# Patient Record
Sex: FEMALE
[De-identification: names, ages, dates, MRNs, and addresses within clinical notes are randomized per-mention and may not be internally consistent; named-entity substitution may affect disease eponyms.]

---

## 2017-01-30 ENCOUNTER — APPOINTMENT (OUTPATIENT)
Dept: PULMONOLOGY | Facility: CLINIC | Age: 57
End: 2017-01-30

## 2017-05-06 ENCOUNTER — TRANSCRIPTION ENCOUNTER (OUTPATIENT)
Age: 57
End: 2017-05-06

## 2018-09-12 ENCOUNTER — TRANSCRIPTION ENCOUNTER (OUTPATIENT)
Age: 58
End: 2018-09-12

## 2018-11-15 ENCOUNTER — TRANSCRIPTION ENCOUNTER (OUTPATIENT)
Age: 58
End: 2018-11-15

## 2019-02-03 ENCOUNTER — TRANSCRIPTION ENCOUNTER (OUTPATIENT)
Age: 59
End: 2019-02-03

## 2019-04-04 ENCOUNTER — APPOINTMENT (OUTPATIENT)
Dept: ORTHOPEDIC SURGERY | Facility: CLINIC | Age: 59
End: 2019-04-04

## 2019-12-16 ENCOUNTER — APPOINTMENT (OUTPATIENT)
Dept: ORTHOPEDIC SURGERY | Facility: CLINIC | Age: 59
End: 2019-12-16
Payer: MEDICAID

## 2019-12-16 VITALS
WEIGHT: 175 LBS | SYSTOLIC BLOOD PRESSURE: 117 MMHG | DIASTOLIC BLOOD PRESSURE: 77 MMHG | HEIGHT: 66 IN | BODY MASS INDEX: 28.12 KG/M2 | HEART RATE: 88 BPM

## 2019-12-16 DIAGNOSIS — Z80.7 FAMILY HISTORY OF OTHER MALIGNANT NEOPLASMS OF LYMPHOID, HEMATOPOIETIC AND RELATED TISSUES: ICD-10-CM

## 2019-12-16 DIAGNOSIS — Z78.9 OTHER SPECIFIED HEALTH STATUS: ICD-10-CM

## 2019-12-16 DIAGNOSIS — Z80.6 FAMILY HISTORY OF LEUKEMIA: ICD-10-CM

## 2019-12-16 DIAGNOSIS — Z82.49 FAMILY HISTORY OF ISCHEMIC HEART DISEASE AND OTHER DISEASES OF THE CIRCULATORY SYSTEM: ICD-10-CM

## 2019-12-16 DIAGNOSIS — Z87.39 PERSONAL HISTORY OF OTHER DISEASES OF THE MUSCULOSKELETAL SYSTEM AND CONNECTIVE TISSUE: ICD-10-CM

## 2019-12-16 DIAGNOSIS — M67.441 GANGLION, RIGHT HAND: ICD-10-CM

## 2019-12-16 DIAGNOSIS — M19.041 PRIMARY OSTEOARTHRITIS, RIGHT HAND: ICD-10-CM

## 2019-12-16 PROCEDURE — 73140 X-RAY EXAM OF FINGER(S): CPT | Mod: F6

## 2019-12-16 PROCEDURE — 99204 OFFICE O/P NEW MOD 45 MIN: CPT

## 2020-02-02 ENCOUNTER — TRANSCRIPTION ENCOUNTER (OUTPATIENT)
Age: 60
End: 2020-02-02

## 2020-02-11 ENCOUNTER — APPOINTMENT (OUTPATIENT)
Dept: PULMONOLOGY | Facility: CLINIC | Age: 60
End: 2020-02-11
Payer: MEDICAID

## 2020-02-11 VITALS
SYSTOLIC BLOOD PRESSURE: 123 MMHG | RESPIRATION RATE: 18 BRPM | HEIGHT: 68 IN | TEMPERATURE: 97.3 F | OXYGEN SATURATION: 97 % | HEART RATE: 95 BPM | DIASTOLIC BLOOD PRESSURE: 80 MMHG | WEIGHT: 189 LBS | BODY MASS INDEX: 28.64 KG/M2

## 2020-02-11 PROCEDURE — 99204 OFFICE O/P NEW MOD 45 MIN: CPT | Mod: 25

## 2020-02-11 PROCEDURE — 36415 COLL VENOUS BLD VENIPUNCTURE: CPT

## 2020-02-11 NOTE — REVIEW OF SYSTEMS
[Fatigue] : fatigue [Postnasal Drip] : postnasal drip [Cough] : cough [Frequent URIs] : frequent URIs [Chest Tightness] : chest tightness [Dyspnea] : dyspnea [Wheezing] : wheezing [SOB on Exertion] : sob on exertion [GERD] : gerd [Myalgias] : myalgias [Headache] : headache [Tremor] : tremor [Depression] : depression [Anxiety] : anxiety [Negative] : Endocrine [TextBox_44] : pulmonary stenosis

## 2020-02-11 NOTE — HISTORY OF PRESENT ILLNESS
[Never] : never [TextBox_4] : Patient with PMH asthma, sarcoidosis, mild pulmonary stenosis presents for pulmonary evaluation following 2 week hospitalization at [facility] and [place] in January 2020. Current pulmonologist is Dr. Emir George.\par \par She was dx Asthma age 28, however over past 4 years has gotten much worse. Has only been on Dulera 200mcg as a controller inhaler, no prior controller was required. Has been hospitalized 4-5 times over the past year, most recently in January with exacerbation +coronavirus requiring frequent duonebs and IV steroids. Treated with cefpodoxime inpatient. Discharged from [facility] and readmitted to [facility] the next day. Total course about 2 weeks. 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 pulmonary involvement. Dx approx 20 years ago.\par \par She has been on fasenra for about 1.5 years, however has not helped. overdue for next injection by 1 month. Controller regimen now includes singulair, dulera, spiriva. Currently on prednisone 40mg daily, has been tapering down since hospitalization. Currently taking about 4 duoneb treatments daily.\par \par Lifelong nonsmoker. Hx anxiety depression. Symptoms include dyspnea, chest pain/tightness, cough, wheeze. Denies sudden onset, no vocal symptoms. Possible PND, no therapy aside from saline. GERD treated with protonix.

## 2020-02-11 NOTE — ASSESSMENT
[FreeTextEntry1] : 1. Asthma: S/p exacerbation, now recovering. Will continue prednisone taper 30mg x 3 days, 20mg x 3 days, 10mg x 3 days. Advised to use Xopenex prn chest tightness or wheezing, nebulizer only when symptoms are very significant. For insurance coverage will try to start patient on Breo 200mcg, Incruse as maintenance medication for asthma. Continue Montelukast. Patient educated and demonstration provided on administration. Will transition Fasenra injection to our office, however patient is advised to obtain current injection as her prior pulmonologist has already ordered it for this month. Will prescribe Fluticasone nasal spray for nasal congestion/PND. Check A1A for congenital COPD. Patient educated on administration and importance of daily maintenance use for efficacy. return to office in 2 weeka for f/u.

## 2020-02-11 NOTE — END OF VISIT
[FreeTextEntry3] : I directly supervised nurse practitioner Kwame Thomson and was present during key points of his history and physical. I agree with his history, physical and assessment.\par

## 2020-02-11 NOTE — PHYSICAL EXAM
[Normal Appearance] : normal appearance [No Acute Distress] : no acute distress [Normal Oropharynx] : normal oropharynx [No Neck Mass] : no neck mass [No Resp Distress] : no resp distress [Normal S1, S2] : normal s1, s2 [No Murmurs] : no murmurs [Clear to Auscultation Bilaterally] : clear to auscultation bilaterally [No Abnormalities] : no abnormalities [Benign] : benign [No Clubbing] : no clubbing [Normal Gait] : normal gait [No Cyanosis] : no cyanosis [No Edema] : no edema [FROM] : FROM [No Focal Deficits] : no focal deficits [Oriented x3] : oriented x3 [Normal Color/ Pigmentation] : normal color/ pigmentation [TextBox_54] : tachycardic

## 2020-02-12 LAB — A1AT SERPL-MCNC: 133 MG/DL

## 2020-02-20 ENCOUNTER — APPOINTMENT (OUTPATIENT)
Dept: RADIOLOGY | Facility: IMAGING CENTER | Age: 60
End: 2020-02-20
Payer: MEDICAID

## 2020-02-20 ENCOUNTER — OUTPATIENT (OUTPATIENT)
Dept: OUTPATIENT SERVICES | Facility: HOSPITAL | Age: 60
LOS: 1 days | End: 2020-02-20
Payer: MEDICAID

## 2020-02-20 ENCOUNTER — LABORATORY RESULT (OUTPATIENT)
Age: 60
End: 2020-02-20

## 2020-02-20 ENCOUNTER — APPOINTMENT (OUTPATIENT)
Dept: PULMONOLOGY | Facility: CLINIC | Age: 60
End: 2020-02-20
Payer: MEDICAID

## 2020-02-20 VITALS
DIASTOLIC BLOOD PRESSURE: 89 MMHG | RESPIRATION RATE: 17 BRPM | HEART RATE: 118 BPM | HEIGHT: 68 IN | TEMPERATURE: 97.5 F | OXYGEN SATURATION: 97 % | WEIGHT: 187 LBS | SYSTOLIC BLOOD PRESSURE: 137 MMHG | BODY MASS INDEX: 28.34 KG/M2

## 2020-02-20 DIAGNOSIS — J45.909 UNSPECIFIED ASTHMA, UNCOMPLICATED: ICD-10-CM

## 2020-02-20 PROCEDURE — 99213 OFFICE O/P EST LOW 20 MIN: CPT | Mod: 25

## 2020-02-20 PROCEDURE — 71047 X-RAY EXAM CHEST 3 VIEWS: CPT

## 2020-02-20 PROCEDURE — 36415 COLL VENOUS BLD VENIPUNCTURE: CPT

## 2020-02-20 PROCEDURE — 71047 X-RAY EXAM CHEST 3 VIEWS: CPT | Mod: 26

## 2020-02-20 NOTE — PHYSICAL EXAM
[Normal Oropharynx] : normal oropharynx [No Acute Distress] : no acute distress [No Neck Mass] : no neck mass [Normal Appearance] : normal appearance [Normal S1, S2] : normal s1, s2 [No Murmurs] : no murmurs [No Resp Distress] : no resp distress [No Abnormalities] : no abnormalities [Clear to Auscultation Bilaterally] : clear to auscultation bilaterally [Benign] : benign [Normal Gait] : normal gait [No Clubbing] : no clubbing [No Cyanosis] : no cyanosis [FROM] : FROM [No Edema] : no edema [Normal Color/ Pigmentation] : normal color/ pigmentation [No Focal Deficits] : no focal deficits [Oriented x3] : oriented x3 [TextBox_54] : tachycardic

## 2020-02-20 NOTE — REVIEW OF SYSTEMS
[Fatigue] : fatigue [Postnasal Drip] : postnasal drip [Frequent URIs] : frequent URIs [Chest Tightness] : chest tightness [Cough] : cough [Dyspnea] : dyspnea [Wheezing] : wheezing [SOB on Exertion] : sob on exertion [GERD] : gerd [Myalgias] : myalgias [Headache] : headache [Tremor] : tremor [Anxiety] : anxiety [Depression] : depression [Negative] : Endocrine [TextBox_44] : pulmonary stenosis

## 2020-02-20 NOTE — HISTORY OF PRESENT ILLNESS
[Never] : never [TextBox_4] : Patient with PMH asthma, sarcoidosis (s/p splenectomy), mild pulmonary stenosis presents for followup.\par \par She was dx Asthma age 28, however over past 4 years has gotten much worse. Has only been on Dulera 200mcg as a controller inhaler, no prior controller was required. Has been hospitalized 4-5 times over the past year, most recently in January with exacerbation +coronavirus requiring frequent duonebs and IV steroids. Treated with cefpodoxime inpatient. Discharged from [facility] and readmitted to [facility] the next day. Total course about 2 weeks. Has been refractory to steroid taper, was on 60mg at discharge with plan to taper down to 40mg until seen by pulmonary. She has been on prednisone or IV steroids for most of the past 3 months.\par \par She has been on fasenra for about 1.5 years, last injection was last week with prior pulmonologist. Controller regimen now includes singulair, Breo, Incruse. Currently on prednisone 30mg daily, had been tapering down but symptoms recurred at 10mg. No longer taking duonebs, xopenex HFA about twice daily. ?PND treated with flonase daily. GERD treated with protonix.\par \par Symptoms worsened about 3 days ago, with ongoing dyspnea, chest tightness, cough. She sometimes wheezes. Denies fevers, chills. Some fatigue. ECG/echo during hospitalization negative aside from mild PS. Prescribed augmentin x 1 week for green sputum and increased prednisone to 30mg with a tapering schedule at that time. She states that symptoms are about the same however no further green sputum.\par \par Hx of anxiety and depression, currently feels very overwhelmed by recent hospitalization and asthma exacerbation. Currently has been prescribed alprazolam prn by PCP and sees a  weekly for therapy. Has never been prescribed chronic medication for anxiety or depression. Denies suicidal ideation at this time. Denies thoughts of harming others. Denies illicit drug use.

## 2020-02-21 LAB
A ALTERNATA IGE QN: <0.1 KUA/L
A FUMIGATUS IGE QN: <0.1 KUA/L
ANION GAP SERPL CALC-SCNC: 16 MMOL/L
BASOPHILS # BLD AUTO: 0.03 K/UL
BASOPHILS NFR BLD AUTO: 0.2 %
BUN SERPL-MCNC: 18 MG/DL
C ALBICANS IGE QN: <0.1 KUA/L
C HERBARUM IGE QN: <0.1 KUA/L
CALCIUM SERPL-MCNC: 9.3 MG/DL
CAT DANDER IGE QN: <0.1 KUA/L
CHLORIDE SERPL-SCNC: 100 MMOL/L
CO2 SERPL-SCNC: 27 MMOL/L
COMMON RAGWEED IGE QN: <0.1 KUA/L
CREAT SERPL-MCNC: 0.95 MG/DL
D FARINAE IGE QN: 0.32 KUA/L
D PTERONYSS IGE QN: 0.23 KUA/L
DEPRECATED A ALTERNATA IGE RAST QL: 0
DEPRECATED A FUMIGATUS IGE RAST QL: 0
DEPRECATED C ALBICANS IGE RAST QL: 0
DEPRECATED C HERBARUM IGE RAST QL: 0
DEPRECATED CAT DANDER IGE RAST QL: 0
DEPRECATED COMMON RAGWEED IGE RAST QL: 0
DEPRECATED D FARINAE IGE RAST QL: NORMAL
DEPRECATED D PTERONYSS IGE RAST QL: NORMAL
DEPRECATED DOG DANDER IGE RAST QL: 0
DEPRECATED M RACEMOSUS IGE RAST QL: 0
DEPRECATED ROACH IGE RAST QL: 0
DEPRECATED TIMOTHY IGE RAST QL: 0
DEPRECATED WHITE OAK IGE RAST QL: 0
DOG DANDER IGE QN: <0.1 KUA/L
EOSINOPHIL # BLD AUTO: 0.08 K/UL
EOSINOPHIL NFR BLD AUTO: 0.5 %
GLUCOSE SERPL-MCNC: 97 MG/DL
HCT VFR BLD CALC: 46.6 %
HGB BLD-MCNC: 14.4 G/DL
IMM GRANULOCYTES NFR BLD AUTO: 1.4 %
LYMPHOCYTES # BLD AUTO: 1.83 K/UL
LYMPHOCYTES NFR BLD AUTO: 11.4 %
M RACEMOSUS IGE QN: <0.1 KUA/L
MAN DIFF?: NORMAL
MCHC RBC-ENTMCNC: 30.9 GM/DL
MCHC RBC-ENTMCNC: 32.7 PG
MCV RBC AUTO: 105.7 FL
MONOCYTES # BLD AUTO: 0.7 K/UL
MONOCYTES NFR BLD AUTO: 4.4 %
NEUTROPHILS # BLD AUTO: 13.16 K/UL
NEUTROPHILS NFR BLD AUTO: 82.1 %
PLATELET # BLD AUTO: 245 K/UL
POTASSIUM SERPL-SCNC: 4.6 MMOL/L
RBC # BLD: 4.41 M/UL
RBC # FLD: 16.2 %
ROACH IGE QN: <0.1 KUA/L
SODIUM SERPL-SCNC: 143 MMOL/L
TIMOTHY IGE QN: <0.1 KUA/L
TOTAL IGE SMQN RAST: 2 KU/L
WBC # FLD AUTO: 16.03 K/UL
WHITE OAK IGE QN: <0.1 KUA/L

## 2020-03-04 ENCOUNTER — APPOINTMENT (OUTPATIENT)
Dept: PULMONOLOGY | Facility: CLINIC | Age: 60
End: 2020-03-04
Payer: MEDICAID

## 2020-03-04 VITALS
WEIGHT: 191 LBS | TEMPERATURE: 98.1 F | SYSTOLIC BLOOD PRESSURE: 136 MMHG | OXYGEN SATURATION: 99 % | BODY MASS INDEX: 28.95 KG/M2 | RESPIRATION RATE: 18 BRPM | HEART RATE: 118 BPM | DIASTOLIC BLOOD PRESSURE: 85 MMHG | HEIGHT: 68 IN

## 2020-03-04 VITALS — HEIGHT: 65.5 IN | OXYGEN SATURATION: 98 % | HEART RATE: 109 BPM | BODY MASS INDEX: 31.11 KG/M2 | WEIGHT: 189 LBS

## 2020-03-04 PROCEDURE — 94060 EVALUATION OF WHEEZING: CPT

## 2020-03-04 PROCEDURE — 94729 DIFFUSING CAPACITY: CPT

## 2020-03-04 PROCEDURE — 94726 PLETHYSMOGRAPHY LUNG VOLUMES: CPT

## 2020-03-04 PROCEDURE — ZZZZZ: CPT

## 2020-03-04 PROCEDURE — 99214 OFFICE O/P EST MOD 30 MIN: CPT | Mod: 25

## 2020-03-04 NOTE — PHYSICAL EXAM
[No Acute Distress] : no acute distress [Normal Appearance] : normal appearance [Normal Oropharynx] : normal oropharynx [No Neck Mass] : no neck mass [Normal S1, S2] : normal s1, s2 [No Murmurs] : no murmurs [No Resp Distress] : no resp distress [Clear to Auscultation Bilaterally] : clear to auscultation bilaterally [No Abnormalities] : no abnormalities [Benign] : benign [Normal Gait] : normal gait [No Clubbing] : no clubbing [No Cyanosis] : no cyanosis [No Edema] : no edema [FROM] : FROM [Normal Color/ Pigmentation] : normal color/ pigmentation [No Focal Deficits] : no focal deficits [Oriented x3] : oriented x3 [TextBox_54] : tachycardic

## 2020-03-04 NOTE — ASSESSMENT
[FreeTextEntry1] : 1. Asthma: Continue therapy with Breo, Incruse, Singular, Fasenra as directed. Plan to taper off prednisone by next week. Will continue to use xopenex only when needed and try to avoid use of nebulizer unless absolutely necessary. Fasenra AI has been approved, last injection was on 2/13. She will return to office for injection around 4/9 and for f/u in 3 months.

## 2020-03-04 NOTE — HISTORY OF PRESENT ILLNESS
[Never] : never [TextBox_4] : Patient with PMH asthma, sarcoidosis (s/p splenectomy), mild pulmonary stenosis presents for followup.\par \par Patient doing well from a pulmonary standpoint. Now down to 10mg of prednisone x 1 more day, starts 1 week of 5mg tomorrow. No nebulizer use, rare albuterol use. Denies fevers, chills, dyspnea, chest pain/tightness, cough, wheeze. Stressed about recent events in her life.

## 2020-03-19 ENCOUNTER — RX RENEWAL (OUTPATIENT)
Age: 60
End: 2020-03-19

## 2020-04-09 ENCOUNTER — APPOINTMENT (OUTPATIENT)
Dept: PULMONOLOGY | Facility: CLINIC | Age: 60
End: 2020-04-09

## 2020-04-13 ENCOUNTER — RX RENEWAL (OUTPATIENT)
Age: 60
End: 2020-04-13

## 2020-05-07 ENCOUNTER — RX RENEWAL (OUTPATIENT)
Age: 60
End: 2020-05-07

## 2020-05-25 ENCOUNTER — TRANSCRIPTION ENCOUNTER (OUTPATIENT)
Age: 60
End: 2020-05-25

## 2020-07-05 ENCOUNTER — TRANSCRIPTION ENCOUNTER (OUTPATIENT)
Age: 60
End: 2020-07-05

## 2020-07-21 ENCOUNTER — NON-APPOINTMENT (OUTPATIENT)
Age: 60
End: 2020-07-21

## 2020-07-29 ENCOUNTER — APPOINTMENT (OUTPATIENT)
Dept: OTOLARYNGOLOGY | Facility: CLINIC | Age: 60
End: 2020-07-29
Payer: MEDICAID

## 2020-07-29 VITALS — DIASTOLIC BLOOD PRESSURE: 78 MMHG | SYSTOLIC BLOOD PRESSURE: 111 MMHG | HEART RATE: 103 BPM

## 2020-07-29 VITALS — HEIGHT: 66 IN | WEIGHT: 195 LBS | BODY MASS INDEX: 31.34 KG/M2

## 2020-07-29 PROCEDURE — 99204 OFFICE O/P NEW MOD 45 MIN: CPT | Mod: 25

## 2020-07-29 PROCEDURE — 31579 LARYNGOSCOPY TELESCOPIC: CPT

## 2020-07-29 RX ORDER — AMOXICILLIN AND CLAVULANATE POTASSIUM 875; 125 MG/1; MG/1
875-125 TABLET, COATED ORAL
Qty: 6 | Refills: 0 | Status: COMPLETED | COMMUNITY
Start: 2020-02-18 | End: 2020-07-29

## 2020-07-29 RX ORDER — MELOXICAM 15 MG/1
15 TABLET ORAL DAILY
Qty: 14 | Refills: 0 | Status: COMPLETED | COMMUNITY
Start: 2019-12-16 | End: 2020-07-29

## 2020-07-29 RX ORDER — DILTIAZEM HYDROCHLORIDE 90 MG/1
TABLET ORAL
Refills: 0 | Status: ACTIVE | COMMUNITY

## 2020-07-29 NOTE — CONSULT LETTER
[Dear  ___] : Dear  [unfilled], [Consult Letter:] : I had the pleasure of evaluating your patient, [unfilled]. [Please see my note below.] : Please see my note below. [Consult Closing:] : Thank you very much for allowing me to participate in the care of this patient.  If you have any questions, please do not hesitate to contact me. [Sincerely,] : Sincerely, [FreeTextEntry2] : Kwame Thomson [FreeTextEntry3] : Davon Morris MD, PhD\par Chief, Division of Laryngology\par Department of Otolaryngology\par [facility]\par Pediatric Otolaryngology, [facility]\par  of Otolaryngology\par [facility] [employer]\par   [DrPepe  ___] : Dr. ALVARES [DrPepe ___] : Dr. ALVARES

## 2020-07-29 NOTE — PHYSICAL EXAM
[Laryngoscopy Performed] : laryngoscopy was performed, see procedure section for findings [Midline] : trachea located in midline position [Normal] : no rashes [de-identified] : mild hyperfunction

## 2020-07-29 NOTE — HISTORY OF PRESENT ILLNESS
[de-identified] : 59 year female referred by Dr. Kwame Thomson, pulmonologist for vocal cord dysfunction. History of uveitis. States has dyspnea with occasional gasping for air. States difficult to breathe when lying on the side. States has dry cough and a constant need to clear throat. States voice hoarseness for 17 years, progressively getting worse. Denies losing voice. Denies dysphagia, odynophagia. h/o sarcoidosis, asthma, mild pulmonary stenosis\par having bad heartburn symptoms, doubled ppi to BID; when gets labored breathing, does make strangulation noises, unable to get voice out\par

## 2020-09-03 ENCOUNTER — APPOINTMENT (OUTPATIENT)
Dept: OTOLARYNGOLOGY | Facility: CLINIC | Age: 60
End: 2020-09-03
Payer: MEDICAID

## 2020-09-03 VITALS
SYSTOLIC BLOOD PRESSURE: 124 MMHG | HEIGHT: 66 IN | HEART RATE: 105 BPM | DIASTOLIC BLOOD PRESSURE: 73 MMHG | BODY MASS INDEX: 31.34 KG/M2 | WEIGHT: 195 LBS

## 2020-09-03 PROCEDURE — 99214 OFFICE O/P EST MOD 30 MIN: CPT | Mod: 25

## 2020-09-03 PROCEDURE — 31579 LARYNGOSCOPY TELESCOPIC: CPT

## 2020-09-03 RX ORDER — PREDNISONE 10 MG/1
10 TABLET ORAL
Qty: 27 | Refills: 0 | Status: DISCONTINUED | COMMUNITY
Start: 2020-02-18 | End: 2020-09-03

## 2020-09-03 RX ORDER — PREDNISONE 10 MG/1
10 TABLET ORAL
Qty: 25 | Refills: 0 | Status: DISCONTINUED | COMMUNITY
Start: 2020-02-20 | End: 2020-09-03

## 2020-09-03 NOTE — REASON FOR VISIT
[Subsequent Evaluation] : a subsequent evaluation for [Family Member] : family member [FreeTextEntry2] : follow up for LPRD

## 2020-09-03 NOTE — HISTORY OF PRESENT ILLNESS
[de-identified] : 59 year old female follow up for LPRD, history of laryngospasms.  States uvula feel swollen for several months now, prior to last office visit, with feelings of always feeling airway is cut off.  Reports symptoms occurs daily, relieved when remaining calm, continues to take Xanax as prescribed.  States burping very often with hoarseness.  Reports obtaining new mattress, HOB elevation, unsuccessful restful nights.  Reflux precautions maintained, with nasal sprays used as prescribed.  Denies dysphagia, throat pain, fevers, hemoptysis and infections.  Tolerating eating and drinking with no issues. Temperature WNL upon screening when entering facility. Describes tracheomalacia at nighttime.\par Had two episodes swollen uvula, shows picture on phone with erythema and mild edema. \par  \par

## 2020-09-03 NOTE — PHYSICAL EXAM
[Laryngoscopy Performed] : laryngoscopy was performed, see procedure section for findings [Midline] : trachea located in midline position [Normal] : no rashes [de-identified] : mild hyperfunction

## 2020-09-11 ENCOUNTER — APPOINTMENT (OUTPATIENT)
Dept: CT IMAGING | Facility: CLINIC | Age: 60
End: 2020-09-11
Payer: MEDICAID

## 2020-09-11 ENCOUNTER — OUTPATIENT (OUTPATIENT)
Dept: OUTPATIENT SERVICES | Facility: HOSPITAL | Age: 60
LOS: 1 days | End: 2020-09-11
Payer: MEDICAID

## 2020-09-11 DIAGNOSIS — J39.8 OTHER SPECIFIED DISEASES OF UPPER RESPIRATORY TRACT: ICD-10-CM

## 2020-09-11 PROCEDURE — 71250 CT THORAX DX C-: CPT

## 2020-09-11 PROCEDURE — 71250 CT THORAX DX C-: CPT | Mod: 26

## 2020-09-12 ENCOUNTER — TRANSCRIPTION ENCOUNTER (OUTPATIENT)
Age: 60
End: 2020-09-12

## 2020-09-17 ENCOUNTER — TRANSCRIPTION ENCOUNTER (OUTPATIENT)
Age: 60
End: 2020-09-17

## 2020-09-19 ENCOUNTER — TRANSCRIPTION ENCOUNTER (OUTPATIENT)
Age: 60
End: 2020-09-19

## 2020-09-21 ENCOUNTER — RX RENEWAL (OUTPATIENT)
Age: 60
End: 2020-09-21

## 2020-10-28 ENCOUNTER — APPOINTMENT (OUTPATIENT)
Dept: PULMONOLOGY | Facility: CLINIC | Age: 60
End: 2020-10-28
Payer: MEDICAID

## 2020-10-28 VITALS
OXYGEN SATURATION: 97 % | BODY MASS INDEX: 32.14 KG/M2 | TEMPERATURE: 98.1 F | WEIGHT: 200 LBS | RESPIRATION RATE: 18 BRPM | HEART RATE: 80 BPM | DIASTOLIC BLOOD PRESSURE: 82 MMHG | SYSTOLIC BLOOD PRESSURE: 123 MMHG | HEIGHT: 66 IN

## 2020-10-28 PROCEDURE — 99214 OFFICE O/P EST MOD 30 MIN: CPT

## 2020-10-28 PROCEDURE — 99072 ADDL SUPL MATRL&STAF TM PHE: CPT

## 2020-10-28 NOTE — REVIEW OF SYSTEMS
[Fatigue] : fatigue [Postnasal Drip] : postnasal drip [Cough] : cough [Chest Tightness] : chest tightness [Frequent URIs] : frequent URIs [Dyspnea] : dyspnea [Wheezing] : wheezing [SOB on Exertion] : sob on exertion [GERD] : gerd [Myalgias] : myalgias [Headache] : headache [Tremor] : tremor [Depression] : depression [Anxiety] : anxiety [Negative] : Endocrine [TextBox_44] : pulmonary stenosis

## 2020-10-28 NOTE — PHYSICAL EXAM
[No Acute Distress] : no acute distress [Normal Oropharynx] : normal oropharynx [Normal Appearance] : normal appearance [No Neck Mass] : no neck mass [Normal S1, S2] : normal s1, s2 [No Murmurs] : no murmurs [No Resp Distress] : no resp distress [Clear to Auscultation Bilaterally] : clear to auscultation bilaterally [No Abnormalities] : no abnormalities [Benign] : benign [Normal Gait] : normal gait [No Clubbing] : no clubbing [No Cyanosis] : no cyanosis [No Edema] : no edema [FROM] : FROM [Normal Color/ Pigmentation] : normal color/ pigmentation [No Focal Deficits] : no focal deficits [Oriented x3] : oriented x3 [TextBox_54] : tachycardic

## 2020-10-28 NOTE — ASSESSMENT
[FreeTextEntry1] : 1. Severe asthma: Well controlled on fasenra, breo, incruse, montelukast. NO change in treatment. Had flu vaccine.

## 2020-10-28 NOTE — HISTORY OF PRESENT ILLNESS
[TextBox_4] : Patient doing well. Seems asthma well controlled. Also seeing Dr. Morris for vcd, reflux. No exacerbation requiring steroids, no ER visits in past 6 months. Has had 3 different tests for covid in past 6 months, all negative. Has used antibiotics for URIs. Using breo, incruse and fasenra, and montelukast

## 2020-11-11 NOTE — REASON FOR VISIT
Refills sent to pharmacy.    [Initial Evaluation] : an initial evaluation for [FreeTextEntry2] : referred by Dr. Kwame Thomson, pulmonologist for vocal cord dysfunction.

## 2020-11-12 ENCOUNTER — APPOINTMENT (OUTPATIENT)
Dept: OTOLARYNGOLOGY | Facility: CLINIC | Age: 60
End: 2020-11-12
Payer: MEDICAID

## 2020-11-12 DIAGNOSIS — J31.0 CHRONIC RHINITIS: ICD-10-CM

## 2020-11-12 PROCEDURE — 31579 LARYNGOSCOPY TELESCOPIC: CPT

## 2020-11-12 PROCEDURE — 99214 OFFICE O/P EST MOD 30 MIN: CPT | Mod: 25

## 2020-11-13 ENCOUNTER — NON-APPOINTMENT (OUTPATIENT)
Age: 60
End: 2020-11-13

## 2020-11-17 NOTE — CONSULT LETTER
[Dear  ___] : Dear  [unfilled], [Consult Letter:] : I had the pleasure of evaluating your patient, [unfilled]. [Please see my note below.] : Please see my note below. [Consult Closing:] : Thank you very much for allowing me to participate in the care of this patient.  If you have any questions, please do not hesitate to contact me. [Sincerely,] : Sincerely, [FreeTextEntry3] : Davon Morris MD, PhD\par Chief, Division of Laryngology\par Department of Otolaryngology\par [facility]\par Pediatric Otolaryngology, [facility]\par  of Otolaryngology\par [facility] [employer]\par \par \par

## 2020-11-17 NOTE — HISTORY OF PRESENT ILLNESS
[de-identified] : 58yo F here for f/u laryngospasm and coughing. Has increased snorting cough. More so positional. Mild improvement from previous visit. Taking famotidine nightly. Doing nasal sprays as well. Voice has been hoarse intermittently. Breathing was poor yesterday, had increased SOB, had low O2 89-91 and pulse was 129 yesterday. Has palpitations often. \par Epidural next week for neck and head pain, has h/o migraine.\par h/o sarcoid, fibromyalgia\par \par \par

## 2020-12-15 ENCOUNTER — NON-APPOINTMENT (OUTPATIENT)
Age: 60
End: 2020-12-15

## 2020-12-21 ENCOUNTER — NON-APPOINTMENT (OUTPATIENT)
Age: 60
End: 2020-12-21

## 2021-01-20 ENCOUNTER — APPOINTMENT (OUTPATIENT)
Dept: PULMONOLOGY | Facility: CLINIC | Age: 61
End: 2021-01-20

## 2021-01-21 ENCOUNTER — APPOINTMENT (OUTPATIENT)
Dept: PULMONOLOGY | Facility: CLINIC | Age: 61
End: 2021-01-21
Payer: MEDICAID

## 2021-01-21 PROCEDURE — 99213 OFFICE O/P EST LOW 20 MIN: CPT | Mod: 95

## 2021-01-21 NOTE — ASSESSMENT
[FreeTextEntry1] : 1. Severe asthma: Will try to change Breo/Incruse to Trelegy 200mcg if approved by insurance. Continue Fasenra, Montelukast, Flonase prn.\par \par 2. Loss appetite, chest warmth: Cardiac etiology has been r/o, advised continued f/u with PCP. No evidence for pulmonary etiology.\par \par I, Kwame Thomson NP, am scribing for and in the presence of Dr. Chad Bob, the following sections HISTORY OF PRESENT ILLNESS, PAST MEDICAL/FAMILY/SOCIAL HISTORY; REVIEW OF SYSTEMS; VITAL SIGNS; PHYSICAL EXAM; DISPOSITION.

## 2021-01-21 NOTE — HISTORY OF PRESENT ILLNESS
[Home] : at home, [unfilled] , at the time of the visit. [Medical Office: ([facility])___] : at the medical office located in  [Other:____] : [unfilled] [Verbal consent obtained from patient] : the patient, [unfilled] [TextBox_4] : Saw cardiologist this week because she was experiencing a warm feeling on the left side of her chest. It radiated to her neck and armpit over the past weekend. When she exhales it seems to intensifies. Stress test, echo, ECG performed and nothing abnormal. She also describes a symptom in which when she is lying down flat, she wakes up sometimes with a snort. Also notes loss of appetite, seen by PCP and given some miralax. No longer having migraines, only one over past 2 months. Using CBD gummies.s\par \par Currently managed on Breo, Incruse, Montelukast, Fasenra. Has not required rescue inhaler at all.

## 2021-01-22 ENCOUNTER — NON-APPOINTMENT (OUTPATIENT)
Age: 61
End: 2021-01-22

## 2021-01-28 ENCOUNTER — APPOINTMENT (OUTPATIENT)
Dept: RHEUMATOLOGY | Facility: CLINIC | Age: 61
End: 2021-01-28
Payer: MEDICAID

## 2021-01-28 ENCOUNTER — LABORATORY RESULT (OUTPATIENT)
Age: 61
End: 2021-01-28

## 2021-01-28 VITALS
TEMPERATURE: 97.1 F | HEART RATE: 102 BPM | SYSTOLIC BLOOD PRESSURE: 134 MMHG | WEIGHT: 196 LBS | HEIGHT: 66 IN | OXYGEN SATURATION: 97 % | DIASTOLIC BLOOD PRESSURE: 83 MMHG | BODY MASS INDEX: 31.5 KG/M2

## 2021-01-28 DIAGNOSIS — R20.2 PARESTHESIA OF SKIN: ICD-10-CM

## 2021-01-28 PROCEDURE — 99072 ADDL SUPL MATRL&STAF TM PHE: CPT

## 2021-01-28 PROCEDURE — 99204 OFFICE O/P NEW MOD 45 MIN: CPT

## 2021-01-28 RX ORDER — VALACYCLOVIR 500 MG/1
500 TABLET, FILM COATED ORAL
Refills: 0 | Status: ACTIVE | COMMUNITY

## 2021-01-28 RX ORDER — ASCORBIC ACID 500 MG
TABLET ORAL
Refills: 0 | Status: ACTIVE | COMMUNITY

## 2021-01-28 RX ORDER — BENRALIZUMAB 30 MG/ML
30 INJECTION, SOLUTION SUBCUTANEOUS
Qty: 1 | Refills: 5 | Status: DISCONTINUED | COMMUNITY
Start: 2020-06-26 | End: 2021-01-28

## 2021-01-28 RX ORDER — PRIMIDONE 50 MG/1
50 TABLET ORAL
Refills: 0 | Status: ACTIVE | COMMUNITY

## 2021-01-28 RX ORDER — ERENUMAB-AOOE 140 MG/ML
140 INJECTION, SOLUTION SUBCUTANEOUS
Refills: 0 | Status: ACTIVE | COMMUNITY

## 2021-01-28 RX ORDER — FLUTICASONE PROPIONATE AND SALMETEROL 500; 50 UG/1; UG/1
500-50 POWDER RESPIRATORY (INHALATION) TWICE DAILY
Qty: 1 | Refills: 3 | Status: DISCONTINUED | COMMUNITY
Start: 2020-03-12 | End: 2021-01-28

## 2021-01-28 RX ORDER — CHOLECALCIFEROL (VITAMIN D3) 25 MCG
TABLET,CHEWABLE ORAL
Refills: 0 | Status: ACTIVE | COMMUNITY

## 2021-01-28 RX ORDER — CHOLECALCIFEROL (VITAMIN D3) 25 MCG
TABLET ORAL
Refills: 0 | Status: ACTIVE | COMMUNITY

## 2021-01-28 RX ORDER — ASCORBIC ACID/BIOFLAVONOIDS 500-200 MG
TABLET ORAL
Refills: 0 | Status: DISCONTINUED | COMMUNITY
End: 2021-01-28

## 2021-01-28 RX ORDER — ZINC SULFATE 50(220)MG
50 CAPSULE ORAL
Refills: 0 | Status: ACTIVE | COMMUNITY

## 2021-01-28 RX ORDER — PANTOPRAZOLE SODIUM 20 MG/1
TABLET, DELAYED RELEASE ORAL
Refills: 0 | Status: DISCONTINUED | COMMUNITY
End: 2021-01-28

## 2021-01-28 RX ORDER — CEPHALEXIN 750 MG/1
CAPSULE ORAL
Refills: 0 | Status: DISCONTINUED | COMMUNITY
End: 2021-01-28

## 2021-01-28 RX ORDER — FLUTICASONE FUROATE, UMECLIDINIUM BROMIDE AND VILANTEROL TRIFENATATE 200; 62.5; 25 UG/1; UG/1; UG/1
200-62.5-25 POWDER RESPIRATORY (INHALATION) DAILY
Qty: 1 | Refills: 11 | Status: DISCONTINUED | COMMUNITY
Start: 2021-01-21 | End: 2021-01-28

## 2021-01-31 PROBLEM — R20.2 PARESTHESIAS: Status: ACTIVE | Noted: 2021-01-31

## 2021-02-02 LAB
25(OH)D3 SERPL-MCNC: 42.6 NG/ML
ACE BLD-CCNC: 38 U/L
ALBUMIN MFR SERPL ELPH: 57.9 %
ALBUMIN SERPL ELPH-MCNC: 4.4 G/DL
ALBUMIN SERPL-MCNC: 4.3 G/DL
ALBUMIN/GLOB SERPL: 1.4 RATIO
ALP BLD-CCNC: 109 U/L
ALPHA1 GLOB MFR SERPL ELPH: 4 %
ALPHA1 GLOB SERPL ELPH-MCNC: 0.3 G/DL
ALPHA2 GLOB MFR SERPL ELPH: 12.3 %
ALPHA2 GLOB SERPL ELPH-MCNC: 0.9 G/DL
ALT SERPL-CCNC: 16 U/L
ANA SER IF-ACNC: NEGATIVE
ANION GAP SERPL CALC-SCNC: 14 MMOL/L
APPEARANCE: CLEAR
AST SERPL-CCNC: 17 U/L
B-GLOBULIN MFR SERPL ELPH: 12.8 %
B-GLOBULIN SERPL ELPH-MCNC: 0.9 G/DL
BASOPHILS # BLD AUTO: 0.01 K/UL
BASOPHILS NFR BLD AUTO: 0.2 %
BILIRUB SERPL-MCNC: 0.2 MG/DL
BILIRUBIN URINE: NEGATIVE
BLOOD URINE: NEGATIVE
BUN SERPL-MCNC: 16 MG/DL
C3 SERPL-MCNC: 154 MG/DL
C4 SERPL-MCNC: 34 MG/DL
CALCIUM SERPL-MCNC: 9.9 MG/DL
CHLORIDE SERPL-SCNC: 103 MMOL/L
CO2 SERPL-SCNC: 26 MMOL/L
COLOR: NORMAL
CREAT SERPL-MCNC: 0.86 MG/DL
CRP SERPL-MCNC: 0.9 MG/DL
DEPRECATED KAPPA LC FREE/LAMBDA SER: 1.69 RATIO
DSDNA AB SER-ACNC: <12 IU/ML
ENA RNP AB SER IA-ACNC: 0.8 AL
ENA SM AB SER IA-ACNC: <0.2 AL
ENA SS-A AB SER IA-ACNC: <0.2 AL
ENA SS-B AB SER IA-ACNC: <0.2 AL
EOSINOPHIL # BLD AUTO: 0 K/UL
EOSINOPHIL NFR BLD AUTO: 0 %
ERYTHROCYTE [SEDIMENTATION RATE] IN BLOOD BY WESTERGREN METHOD: 58 MM/HR
FERRITIN SERPL-MCNC: 161 NG/ML
FOLATE SERPL-MCNC: 10.9 NG/ML
GAMMA GLOB FLD ELPH-MCNC: 1 G/DL
GAMMA GLOB MFR SERPL ELPH: 13 %
GLUCOSE QUALITATIVE U: NEGATIVE
GLUCOSE SERPL-MCNC: 87 MG/DL
HBV CORE IGG+IGM SER QL: NONREACTIVE
HBV SURFACE AB SER QL: NONREACTIVE
HBV SURFACE AG SER QL: NONREACTIVE
HCT VFR BLD CALC: 42.5 %
HCV AB SER QL: NONREACTIVE
HCV S/CO RATIO: 0.08 S/CO
HGB BLD-MCNC: 13.5 G/DL
IGA SER QL IEP: 200 MG/DL
IGG SER QL IEP: 788 MG/DL
IGM SER QL IEP: 36 MG/DL
IMM GRANULOCYTES NFR BLD AUTO: 0.3 %
INTERPRETATION SERPL IEP-IMP: NORMAL
IRON SATN MFR SERPL: 24 %
IRON SERPL-MCNC: 79 UG/DL
KAPPA LC CSF-MCNC: 0.86 MG/DL
KAPPA LC SERPL-MCNC: 1.45 MG/DL
KETONES URINE: NEGATIVE
LEUKOCYTE ESTERASE URINE: NEGATIVE
LYMPHOCYTES # BLD AUTO: 1.82 K/UL
LYMPHOCYTES NFR BLD AUTO: 30 %
M PROTEIN SPEC IFE-MCNC: NORMAL
M TB IFN-G BLD-IMP: NEGATIVE
MAN DIFF?: NORMAL
MCHC RBC-ENTMCNC: 31.7 PG
MCHC RBC-ENTMCNC: 31.8 GM/DL
MCV RBC AUTO: 99.8 FL
MONOCYTES # BLD AUTO: 0.44 K/UL
MONOCYTES NFR BLD AUTO: 7.2 %
MPO AB + PR3 PNL SER: NORMAL
NEUTROPHILS # BLD AUTO: 3.78 K/UL
NEUTROPHILS NFR BLD AUTO: 62.3 %
NITRITE URINE: NEGATIVE
PH URINE: 6.5
PLATELET # BLD AUTO: 258 K/UL
POTASSIUM SERPL-SCNC: 5 MMOL/L
PROT SERPL-MCNC: 7.2 G/DL
PROT SERPL-MCNC: 7.4 G/DL
PROT SERPL-MCNC: 7.4 G/DL
PROTEIN URINE: NEGATIVE
QUANTIFERON TB PLUS MITOGEN MINUS NIL: 9.44 IU/ML
QUANTIFERON TB PLUS NIL: 0.02 IU/ML
QUANTIFERON TB PLUS TB1 MINUS NIL: 0 IU/ML
QUANTIFERON TB PLUS TB2 MINUS NIL: 0 IU/ML
RBC # BLD: 4.26 M/UL
RBC # FLD: 15.6 %
SODIUM SERPL-SCNC: 143 MMOL/L
SPECIFIC GRAVITY URINE: 1.01
TIBC SERPL-MCNC: 335 UG/DL
TSH SERPL-ACNC: 1.37 UIU/ML
UIBC SERPL-MCNC: 256 UG/DL
UROBILINOGEN URINE: NORMAL
VIT B12 SERPL-MCNC: 682 PG/ML
WBC # FLD AUTO: 6.07 K/UL

## 2021-02-03 LAB — IGA 24H UR QL IFE: NORMAL

## 2021-02-04 ENCOUNTER — RX RENEWAL (OUTPATIENT)
Age: 61
End: 2021-02-04

## 2021-02-15 ENCOUNTER — FORM ENCOUNTER (OUTPATIENT)
Age: 61
End: 2021-02-15

## 2021-02-16 ENCOUNTER — APPOINTMENT (OUTPATIENT)
Dept: DISASTER EMERGENCY | Facility: HOSPITAL | Age: 61
End: 2021-02-16
Payer: MEDICAID

## 2021-02-16 ENCOUNTER — OUTPATIENT (OUTPATIENT)
Dept: OUTPATIENT SERVICES | Facility: HOSPITAL | Age: 61
LOS: 1 days | End: 2021-02-16
Payer: MEDICAID

## 2021-02-16 ENCOUNTER — APPOINTMENT (OUTPATIENT)
Dept: PULMONOLOGY | Facility: CLINIC | Age: 61
End: 2021-02-16

## 2021-02-16 ENCOUNTER — TRANSCRIPTION ENCOUNTER (OUTPATIENT)
Age: 61
End: 2021-02-16

## 2021-02-16 VITALS
TEMPERATURE: 100 F | SYSTOLIC BLOOD PRESSURE: 119 MMHG | HEIGHT: 66 IN | WEIGHT: 192.02 LBS | DIASTOLIC BLOOD PRESSURE: 79 MMHG | HEART RATE: 102 BPM | RESPIRATION RATE: 20 BRPM | OXYGEN SATURATION: 96 %

## 2021-02-16 VITALS
OXYGEN SATURATION: 98 % | TEMPERATURE: 98 F | DIASTOLIC BLOOD PRESSURE: 76 MMHG | HEART RATE: 82 BPM | RESPIRATION RATE: 20 BRPM | SYSTOLIC BLOOD PRESSURE: 113 MMHG

## 2021-02-16 DIAGNOSIS — U07.1 COVID-19: ICD-10-CM

## 2021-02-16 PROCEDURE — 99443: CPT

## 2021-02-16 RX ORDER — SODIUM CHLORIDE 9 MG/ML
250 INJECTION INTRAMUSCULAR; INTRAVENOUS; SUBCUTANEOUS
Refills: 0 | Status: DISCONTINUED | OUTPATIENT
Start: 2021-02-16 | End: 2021-03-03

## 2021-02-16 RX ORDER — BAMLANIVIMAB 35 MG/ML
700 INJECTION, SOLUTION INTRAVENOUS ONCE
Refills: 0 | Status: COMPLETED | OUTPATIENT
Start: 2021-02-16 | End: 2021-02-16

## 2021-02-16 RX ADMIN — BAMLANIVIMAB 270 MILLIGRAM(S): 35 INJECTION, SOLUTION INTRAVENOUS at 15:30

## 2021-02-16 RX ADMIN — SODIUM CHLORIDE 25 MILLILITER(S): 9 INJECTION INTRAMUSCULAR; INTRAVENOUS; SUBCUTANEOUS at 16:41

## 2021-02-16 NOTE — PLAN
[FreeTextEntry1] : Patient encouraged to get antibody treatment. Reassured. Told her to discuss need for steroids and antibiotics with physician who prescribed it, but not necessary from my perspective.

## 2021-02-16 NOTE — CHART NOTE - NSCHARTNOTEFT_GEN_A_CORE
I have reviewed the Bamlanivimab Emergency Use Authorization (EAU) and I have provided the patient or patient's caregiver with the following information:  1. FDA has authorized emergency use of Bamlanivimab, which is not FDA-approved biologic product.  2. The patient or patient's caregiver has the option to accept or refuse administration of Bamlanivimab.  3. The significant known and benefits are unknown.  4. Information on available alternative treatments and risks and benefits of those alternatives.    Discharge:  T(C): 36.7 (02-16-21 @ 17:23), Max: 37.6 (02-16-21 @ 15:11)  HR: 82 (02-16-21 @ 17:23) (82 - 102)  BP: 113/76 (02-16-21 @ 17:23) (110/71 - 124/81)  RR: 20 (02-16-21 @ 17:23) (20 - 20)  SpO2: 98% (02-16-21 @ 17:23) (96% - 98%)  Patient tolerated infusion well denies complaints of chest pain/SOB/dizzines/ palps  VSS for discharge home  D/C instructions given/ fact sheet included.  Patient to follow-up with PCP as needed.
CC: Monoclonal Antibody Infusion/COVID 19 Positive  60yFemale reports vivitng [place] 1 week ago and 72 hours later she had fatigue, malaise, body aches, cough, headache.  She had COVID PCR + test 2/15, referred for [facility] infusion    exam/findings:  T(C): 37.6 (02-16-21 @ 15:11), Max: 37.6 (02-16-21 @ 15:11)  HR: 102 (02-16-21 @ 15:11) (102 - 102)  BP: 119/79 (02-16-21 @ 15:11) (119/79 - 119/79)  RR: 20 (02-16-21 @ 15:11) (20 - 20)  SpO2: 96% (02-16-21 @ 15:11) (96% - 96%)      PE:   Appearance: NAD	  HEENT:   Normal oral mucosa,   Lymphatic: No lymphadenopathy  Cardiovascular: Normal S1 S2, No JVD, No murmurs, No edema  Respiratory: Lungs clear to auscultation	  Gastrointestinal:  Soft, Non-tender, + BS	  Skin: warm and dry  Neurologic: Non-focal  Extremities: Normal range of motion,    ASSESSMENT:  Pt is a   60 year old female  Covid +  referred by PCP who presents to infusion center for Monoclonal antibody infusion (Bamlanivimab)  Symptoms/ Criteria: fever, cough,SOB, body aches,, diaphoresis  Risk Profile includes: ILD,sarcoid,age > 55 years    PLAN:  - infusion procedure explained to patient   -Consent for monoclonal antibody infusion obtained   - Risk & benefits discussed/all questions answered  -infuse  Bamlanivimab 700mg  IV over one hour   -observe patient for one hour post infusion    Post infusion   Patient tolerated infusion well, denies complaints of chest pain,SOB,dizziness,palpitations  Vital signs remain stable for discharge home  D/C instructions given / fact sheet reviewed and included with discharge paperwork  Pt verbalizes to seek medical attention if needed  To follow with PMD w/in 1 week

## 2021-02-17 ENCOUNTER — TRANSCRIPTION ENCOUNTER (OUTPATIENT)
Age: 61
End: 2021-02-17

## 2021-02-17 ENCOUNTER — APPOINTMENT (OUTPATIENT)
Dept: PULMONOLOGY | Facility: CLINIC | Age: 61
End: 2021-02-17

## 2021-02-17 ENCOUNTER — APPOINTMENT (OUTPATIENT)
Dept: PULMONOLOGY | Facility: CLINIC | Age: 61
End: 2021-02-17
Payer: MEDICAID

## 2021-02-17 PROCEDURE — 99443: CPT

## 2021-02-17 NOTE — PLAN
[FreeTextEntry1] : patient with covid, s/p antibody infusion. Doing ok, no fever, minimal dysponea, sats acceptable. Told to stop steroids. Reassured ok to take xanax.

## 2021-02-17 NOTE — HISTORY OF PRESENT ILLNESS
[Home] : at home, [unfilled] , at the time of the visit. [Medical Office: ([facility])___] : at the medical office located in  [Verbal consent obtained from patient] : the patient, [unfilled] [FreeTextEntry1] : Patient s/p infusion yesterday. Very anxious and was told not to take xanax because of covid 19 infection. Patient also notes a lowering of peak flow, but no wheezing.

## 2021-02-22 ENCOUNTER — APPOINTMENT (OUTPATIENT)
Dept: MRI IMAGING | Facility: CLINIC | Age: 61
End: 2021-02-22

## 2021-02-25 ENCOUNTER — APPOINTMENT (OUTPATIENT)
Dept: OTOLARYNGOLOGY | Facility: CLINIC | Age: 61
End: 2021-02-25

## 2021-02-25 ENCOUNTER — APPOINTMENT (OUTPATIENT)
Dept: PULMONOLOGY | Facility: CLINIC | Age: 61
End: 2021-02-25
Payer: MEDICAID

## 2021-02-25 PROCEDURE — 99214 OFFICE O/P EST MOD 30 MIN: CPT | Mod: 95

## 2021-02-25 NOTE — DISCUSSION/SUMMARY
[Time Spent: ___ minutes] : I have spent [unfilled] minutes with the patient on the telephone [FreeTextEntry1] : Patient with continued fatigue, dyspnea, weakness. Encouraged to continue to stay oob and exercise. Last fever 2 days ago.

## 2021-02-25 NOTE — HISTORY OF PRESENT ILLNESS
[Home] : at home, [unfilled] , at the time of the visit. [Medical Office: ([facility])___] : at the medical office located in  [FreeTextEntry1] : Patient states she spent 9 days in bed, only getting up to go to bathroom. Saw PT yesterday, told to get up. Start exercising. Oximetry is 93 at rest but goes down to 86 with exercise. Observed patient walk across room, sats stayed at 96 but heart rate jumped to 120.

## 2021-02-25 NOTE — REVIEW OF SYSTEMS
[Fatigue] : fatigue [Postnasal Drip] : postnasal drip [Cough] : cough [Chest Tightness] : chest tightness [Frequent URIs] : frequent URIs [Dyspnea] : dyspnea [Wheezing] : wheezing [SOB on Exertion] : sob on exertion [GERD] : gerd [Headache] : headache [Myalgias] : myalgias [Tremor] : tremor [Depression] : depression [Anxiety] : anxiety [Negative] : Endocrine [TextBox_44] : pulmonary stenosis (1) 41-60 years

## 2021-03-04 ENCOUNTER — APPOINTMENT (OUTPATIENT)
Dept: PULMONOLOGY | Facility: CLINIC | Age: 61
End: 2021-03-04
Payer: MEDICAID

## 2021-03-04 DIAGNOSIS — U07.1 COVID-19: ICD-10-CM

## 2021-03-04 PROCEDURE — 99213 OFFICE O/P EST LOW 20 MIN: CPT | Mod: 95

## 2021-03-04 NOTE — PLAN
[FreeTextEntry1] : Partient seems to be improving clinically. All questions answered. Headache seems to be the predominant sympotm

## 2021-03-04 NOTE — HISTORY OF PRESENT ILLNESS
[Home] : at home, [unfilled] , at the time of the visit. [Medical Office: ([facility])___] : at the medical office located in  [FreeTextEntry1] : Patient noticed loss of smell yesterday. Questions about headache injection and fasenra. Headache seems to be the worst symptoms, as well as shakiness. Nevertheless, some improvement in symptoms. Feels exhaused. Oximetry has actually reached 100%.  No use of rescue inhaler.

## 2021-03-11 ENCOUNTER — NON-APPOINTMENT (OUTPATIENT)
Age: 61
End: 2021-03-11

## 2021-03-30 ENCOUNTER — NON-APPOINTMENT (OUTPATIENT)
Age: 61
End: 2021-03-30

## 2021-04-27 ENCOUNTER — RX RENEWAL (OUTPATIENT)
Age: 61
End: 2021-04-27

## 2021-06-14 PROBLEM — B97.7 HIGH RISK HPV INFECTION: Status: ACTIVE | Noted: 2021-06-14

## 2021-06-14 PROBLEM — R87.612 LOW GRADE SQUAMOUS INTRAEPITHELIAL LESION ON CYTOLOGIC SMEAR OF CERVIX (LGSIL): Status: ACTIVE | Noted: 2021-06-14

## 2021-06-15 ENCOUNTER — APPOINTMENT (OUTPATIENT)
Dept: GYNECOLOGIC ONCOLOGY | Facility: CLINIC | Age: 61
End: 2021-06-15
Payer: MEDICAID

## 2021-06-15 VITALS
OXYGEN SATURATION: 97 % | HEART RATE: 88 BPM | SYSTOLIC BLOOD PRESSURE: 124 MMHG | HEIGHT: 66 IN | WEIGHT: 194 LBS | DIASTOLIC BLOOD PRESSURE: 84 MMHG | RESPIRATION RATE: 16 BRPM | BODY MASS INDEX: 31.18 KG/M2

## 2021-06-15 DIAGNOSIS — R87.612 LOW GRADE SQUAMOUS INTRAEPITHELIAL LESION ON CYTOLOGIC SMEAR OF CERVIX (LGSIL): ICD-10-CM

## 2021-06-15 DIAGNOSIS — B97.7 PAPILLOMAVIRUS AS THE CAUSE OF DISEASES CLASSIFIED ELSEWHERE: ICD-10-CM

## 2021-06-15 PROCEDURE — 99072 ADDL SUPL MATRL&STAF TM PHE: CPT

## 2021-06-15 PROCEDURE — 99203 OFFICE O/P NEW LOW 30 MIN: CPT | Mod: 25

## 2021-06-15 PROCEDURE — 57455 BIOPSY OF CERVIX W/SCOPE: CPT | Mod: 59

## 2021-06-17 ENCOUNTER — APPOINTMENT (OUTPATIENT)
Dept: RHEUMATOLOGY | Facility: CLINIC | Age: 61
End: 2021-06-17
Payer: MEDICAID

## 2021-06-17 ENCOUNTER — NON-APPOINTMENT (OUTPATIENT)
Age: 61
End: 2021-06-17

## 2021-06-17 VITALS
OXYGEN SATURATION: 100 % | DIASTOLIC BLOOD PRESSURE: 85 MMHG | TEMPERATURE: 97.4 F | HEIGHT: 66 IN | WEIGHT: 198 LBS | BODY MASS INDEX: 31.82 KG/M2 | HEART RATE: 95 BPM | SYSTOLIC BLOOD PRESSURE: 122 MMHG

## 2021-06-17 DIAGNOSIS — N95.2 POSTMENOPAUSAL ATROPHIC VAGINITIS: ICD-10-CM

## 2021-06-17 PROCEDURE — 99214 OFFICE O/P EST MOD 30 MIN: CPT

## 2021-06-17 PROCEDURE — 99072 ADDL SUPL MATRL&STAF TM PHE: CPT

## 2021-06-17 RX ORDER — PREDNISONE 20 MG/1
20 TABLET ORAL
Qty: 6 | Refills: 0 | Status: DISCONTINUED | COMMUNITY
Start: 2021-02-10 | End: 2021-06-17

## 2021-06-18 LAB — CORE LAB BIOPSY: NORMAL

## 2021-06-22 NOTE — CHIEF COMPLAINT
[FreeTextEntry1] : [facility]\par \par [facility] [phone] at [address] [place] [id number]\par

## 2021-06-22 NOTE — PROCEDURE
[Abnormal Pap Smear] : an abnormal pap smear [Colposcopy] : colposcopy [Abnormal Pap] : an abnormal pap smear [Patient] : the patient [Verbal Consent] : verbal consent was obtained prior to the procedure and is detailed in the patient's record [Punctation ___ o'clock] : punctation at the [unfilled] ~Uo'clock position [Biopsies Taken: # ___] : [unfilled] biopsies taken of the cervix [Biopsy Locations ___ o'clock] : the biopsies were taken at the [unfilled] o'clock position [Silver Nitrate] : silver nitrate [No Complications] : none [FreeTextEntry3] : punctate capillaries noted on cervix [FreeTextEntry1] : Cervical Colposcopy performed today

## 2021-06-22 NOTE — ASSESSMENT
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 ECC previous to this benign. \par \par I discussed with patient that I would only recommend yearly PAP smears as q 6 months is overtesting. It takes years for dysplasia to progress and therefore yearly testing is more then enough to catch something early on.

## 2021-06-22 NOTE — HISTORY OF PRESENT ILLNESS
[FreeTextEntry1] : 59yo nulligravida LMP in her 30's presents today with referral from Dr. Figueroa for abnormal PAP. Patient with persistent abnormal PAP smears since 2018. 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 ECC prior to this revealed atypical squamos epithelium and scant benign endocervical tissue. She reports LEEP procedure once 20 years ago and treatment with efudex a few years ago as well. She denies pelvic pain, AUB or issues with bowel/bladder habits. Pt. is a nonsmoker. She states she never received gardisal vaccinations. \par \par LMammo- June 2021, normal per pt\par LColonoscopy- 2019, normal per pt\par LBone Density Scan- June 2021, Osteoporosis and is following with a rheumatologist.\par

## 2021-06-22 NOTE — END OF VISIT
[FreeTextEntry3] : Written by Yennifer Doyle PA-C, acting as scribe for Dr. Juan Newsome.\par  [FreeTextEntry2] : This note accurately reflects the work and decisions made by me.\par

## 2021-06-22 NOTE — LETTER BODY
[FreeTextEntry2] : Name: NELDA GARZA \par : 1960 \par \par Date of Visit: Simone 15, 2021 \par \par Dear Dr. Figueroa\par \par I recently saw our mutual patient, NELDA GARZA , in my office.\par \par Below, please see my findings.\par \par As always, it is my sincere pleasure to have the opportunity to participate in one of your patients' care. Please feel free to contact me with any questions or concerns that you may have regarding her care.\par \par Sincerely yours,\par \par Juan Newsome MD\par

## 2021-06-22 NOTE — PHYSICAL EXAM
[Normal] : Bimanual Exam: Normal [de-identified] : Patient was interviewed and examined with chaperone present. Name of Chaperone: Corrina Doyle PA-C

## 2021-06-24 ENCOUNTER — NON-APPOINTMENT (OUTPATIENT)
Age: 61
End: 2021-06-24

## 2021-06-24 ENCOUNTER — APPOINTMENT (OUTPATIENT)
Dept: ORTHOPEDIC SURGERY | Facility: CLINIC | Age: 61
End: 2021-06-24
Payer: MEDICAID

## 2021-06-24 VITALS
SYSTOLIC BLOOD PRESSURE: 118 MMHG | HEART RATE: 90 BPM | DIASTOLIC BLOOD PRESSURE: 77 MMHG | WEIGHT: 199 LBS | BODY MASS INDEX: 31.98 KG/M2 | HEIGHT: 66 IN

## 2021-06-24 DIAGNOSIS — Z87.09 PERSONAL HISTORY OF OTHER DISEASES OF THE RESPIRATORY SYSTEM: ICD-10-CM

## 2021-06-24 DIAGNOSIS — Z86.79 PERSONAL HISTORY OF OTHER DISEASES OF THE CIRCULATORY SYSTEM: ICD-10-CM

## 2021-06-24 DIAGNOSIS — F41.9 ANXIETY DISORDER, UNSPECIFIED: ICD-10-CM

## 2021-06-24 DIAGNOSIS — Z87.898 PERSONAL HISTORY OF OTHER SPECIFIED CONDITIONS: ICD-10-CM

## 2021-06-24 PROCEDURE — 72100 X-RAY EXAM L-S SPINE 2/3 VWS: CPT

## 2021-06-24 PROCEDURE — 99072 ADDL SUPL MATRL&STAF TM PHE: CPT

## 2021-06-24 PROCEDURE — 99214 OFFICE O/P EST MOD 30 MIN: CPT

## 2021-06-24 NOTE — PHYSICAL EXAM
[Obese] : obese [Poor Appearance] : well-appearing [Acute Distress] : not in acute distress [de-identified] : CONSTITUTIONAL: The patient is a very pleasant individual who is well-nourished and who appears stated age.\par PSYCHIATRIC: The patient is alert and oriented X 3 and in no apparent distress, and participates with orthopedic evaluation well.\par HEAD: Atraumatic and is nonsyndromic in appearance.\par EENT: No visible thyromegaly, EOMI.\par RESPIRATORY: Respiratory rate is regular, not dyspneic on examination.\par LYMPHATICS: There is no inguinal lymphadenopathy\par INTEGUMENTARY: Skin is clean, dry, and intact about the bilateral lower extremities and lumbar spine.\par VASCULAR: There is brisk capillary refill about the bilateral lower extremities.\par NEUROLOGIC: There are no pathologic reflexes. There is no decrease in sensation of the bilateral lower extremities on Wartenberg pinwheel examination. Deep tendon reflexes are well maintained at 2+/4 of the bilateral lower extremities and are symmetric.\par MUSCULOSKELETAL: There is no visible muscular atrophy. Manual motor strength is well maintained in the bilateral lower extremities. Range of motion of lumbar spine is well maintained. The patient ambulates in a non-myelopathic manner. Negative tension sign and straight leg raise bilaterally. Quad extension, EHL, plantar flexion, and ankle eversion are well preserved. Normal secondary orthopaedic exam of bilateral hips, greater trochanteric area, knees and ankles. Left foot dorsi flexion weakness that is 4/5 [de-identified] : Xray of the lumbar spine taken 06/24/2021 demonstrates multiple levels of lumbar degenerative disc disease mainly at L4-L5 and L5-S1, there is loss of lordosis.\par \par MRI obtained from [facility] radiology Demonstrates age-appropriate lumbar spondylosis with moderate to severe stenosis at L4-L5 that is left worse than right, L5-S1 stenosis moderate to severe.

## 2021-06-24 NOTE — HISTORY OF PRESENT ILLNESS
[de-identified] : 60 year old F presents for an initial evaluation of lower back pain, patient was referred by Dr. Black. . She states she has gained weight in the last year which contributed to her lower back and left hip pain.  No radiating pain. She is having intermittent loss of bladder sensation and has urinated herself several times. Her pcp has started her on Tizanidine and Meloxicam. Patient has osteopenia in the lumbar region and left hip. Patient also Presented pictures of her urinary incontinence. States his one episode was rather dramatic and his pitches and entire thighs and even down to her lower legs [Ataxia] : no ataxia [Incontinence] : incontinence [Loss of Dexterity] : good dexterity [Urinary Ret.] : no urinary retention

## 2021-06-24 NOTE — ADDENDUM
[FreeTextEntry1] : Documented by Gianluca Gates acting as a scribe for Briana Juan on 12/03/2020. All medical record entries made by the Scribe were at my, Briana Juan, direction and personally dictated by me on 12/03/2020 . I have reviewed the chart and agree that the record accurately reflects my personal performance of the history, physical exam, assessment and plan. I have also personally directed, reviewed, and agreed with the chart.

## 2021-06-24 NOTE — DISCUSSION/SUMMARY
[de-identified] : Anticipatory guidance regarding stenosis was given. \par \par Prior to appointment and during encounter with patient extensive medical records were reviewed including but not limited to, hospital records, out patient records, imaging results, and lab data. During this appointment the patient was examined, diagnoses were discussed and explained in a face to face manner. In addition extensive time was spent reviewing aforementioned diagnostic studies. Counseling including abnormal image results, differential diagnoses, treatment options, risk and benefits, lifestyle changes, current condition, and current medications was performed. Patient's comments, questions, and concerns were address and patient verbalized understanding. Based on this patient's presentation at our office, which is an orthopedic spine surgeon's office, this patient inherently / intrinsically has a risk, however minute, of developing  issues such as Cauda equina syndrome, bowel and bladder changes, or progression of motor or neurological deficits such as paralysis which may be permanent.\par \par I believe this patient's complaints to be multifactorial, however she does have an MRI demonstrating severe stenosis and is complaining of incontinence, based on an unacceptable quality of life I have offered this patient a lumbar laminectomy at L4, partial L5 and L3. Patient wishes to discuss this option with family and think about the option.  Anatomic models, Xrays, CT scans/MRI’s were utilized to provide a firm understanding of their surgical plan. Patient is aware that surgery is elective in nature and he choosing to proceed with surgery. Risks, benefits, alternatives were discussed and all questions, comments and concerns were encouraged and answered to the patient's satisfaction. The statistical probability of improvement was discussed at length as well as post surgical course. Literature from [employer] spine society was provided to the patient regarding the specific type of surgery as well as a 5 page written surgical consent which the patient will need to sign and return to the office prior to surgical date. Consent forms highlight specific complications related to the complex nature of spinal surgery\par  \par Risks of lumbar surgery include: persistent pain, adjacent segment disease (which will require more surgery in future), dural tears, neurologic injury, and wound healing complications\par  \par Benefits of lumbar surgery include Improved neurologic and pain score\par  \par We also discussed with the patient complications of incisions directly related to obesity, diabetes, previous wound complications or post-surgical wound infections, smoking, neuropathy, and chronic anticoagulation. This risk has been specifically discussed and the patient will discuss modifiable risk factors to be optimized prior to surgical management. A multimodality approach of primary care physician, and medicine subspecialist will be utilized to optimize medical risk factors.\par  \par If patient is a smoker, discontinuation of smoking was advised and must be accomplished 6-8 weeks prior to surgery date. Patient was advised that help with quitting smoking is available through [facility] Smoker's Quit Line and phone number/website was provided, or patient can ask assistance from primary care provider. Elective surgery will not be performed unless patient complies with this policy.\par \par Medical comorbidities including but not limited to diabetes, coronary artery disease, renal insufficiency, uncontrolled hypertension, rheumatoid arthritis, auto-immune disorders, COPD/asthma and/or history of radiation, chemotherapy, being on anticoagulants, chronic steroids, immune modulators, have increased statistical chance of leading to increased hospital stay, protracted recovery period, need for acute or subacute rehabilitation post-operative. There can be increased probability of post-surgical and medical complications including but not limited to surgical site infection, need for revision surgery, deep vein thrombosis, pulmonary embolism, exacerbation of COPD/asthma, pneumonia, UTI, urinary retention, and ileus.

## 2021-06-25 ENCOUNTER — APPOINTMENT (OUTPATIENT)
Dept: GYNECOLOGIC ONCOLOGY | Facility: CLINIC | Age: 61
End: 2021-06-25
Payer: MEDICAID

## 2021-06-25 DIAGNOSIS — N87.0 MILD CERVICAL DYSPLASIA: ICD-10-CM

## 2021-06-25 PROCEDURE — 99212 OFFICE O/P EST SF 10 MIN: CPT | Mod: 95

## 2021-06-25 NOTE — ASSESSMENT
[FreeTextEntry1] : Pt is a 61 yo with LSIL, HPV PAP and colposcopy with CIN1. She had biopsy done on office visit on 6/15 and she presents for results. No evidence of high grade dysplasia. I recommend follow up in 1 year with PAP and routine gynecologic care.

## 2021-06-25 NOTE — END OF VISIT
[FreeTextEntry3] : Discharge to routine gynecologic care [Time Spent: ___ minutes] : I have spent [unfilled] minutes of time on the encounter.

## 2021-07-10 ENCOUNTER — RX RENEWAL (OUTPATIENT)
Age: 61
End: 2021-07-10

## 2021-07-15 ENCOUNTER — RX RENEWAL (OUTPATIENT)
Age: 61
End: 2021-07-15

## 2021-07-15 ENCOUNTER — OUTPATIENT (OUTPATIENT)
Dept: OUTPATIENT SERVICES | Facility: HOSPITAL | Age: 61
LOS: 1 days | End: 2021-07-15
Payer: MEDICAID

## 2021-07-15 VITALS
RESPIRATION RATE: 20 BRPM | OXYGEN SATURATION: 99 % | HEART RATE: 97 BPM | HEIGHT: 66 IN | DIASTOLIC BLOOD PRESSURE: 70 MMHG | TEMPERATURE: 98 F | SYSTOLIC BLOOD PRESSURE: 130 MMHG | WEIGHT: 199.52 LBS

## 2021-07-15 DIAGNOSIS — Z90.89 ACQUIRED ABSENCE OF OTHER ORGANS: Chronic | ICD-10-CM

## 2021-07-15 DIAGNOSIS — I37.0 NONRHEUMATIC PULMONARY VALVE STENOSIS: ICD-10-CM

## 2021-07-15 DIAGNOSIS — J38.3 OTHER DISEASES OF VOCAL CORDS: ICD-10-CM

## 2021-07-15 DIAGNOSIS — Z01.818 ENCOUNTER FOR OTHER PREPROCEDURAL EXAMINATION: ICD-10-CM

## 2021-07-15 DIAGNOSIS — M48.062 SPINAL STENOSIS, LUMBAR REGION WITH NEUROGENIC CLAUDICATION: ICD-10-CM

## 2021-07-15 DIAGNOSIS — Z98.890 OTHER SPECIFIED POSTPROCEDURAL STATES: Chronic | ICD-10-CM

## 2021-07-15 DIAGNOSIS — J39.8 OTHER SPECIFIED DISEASES OF UPPER RESPIRATORY TRACT: ICD-10-CM

## 2021-07-15 DIAGNOSIS — Z29.9 ENCOUNTER FOR PROPHYLACTIC MEASURES, UNSPECIFIED: ICD-10-CM

## 2021-07-15 DIAGNOSIS — R00.0 TACHYCARDIA, UNSPECIFIED: ICD-10-CM

## 2021-07-15 DIAGNOSIS — Z90.81 ACQUIRED ABSENCE OF SPLEEN: Chronic | ICD-10-CM

## 2021-07-15 LAB
A1C WITH ESTIMATED AVERAGE GLUCOSE RESULT: 5.5 % — SIGNIFICANT CHANGE UP (ref 4–5.6)
ANION GAP SERPL CALC-SCNC: 12 MMOL/L — SIGNIFICANT CHANGE UP (ref 5–17)
APTT BLD: 37.3 SEC — HIGH (ref 27.5–35.5)
BASOPHILS # BLD AUTO: 0.01 K/UL — SIGNIFICANT CHANGE UP (ref 0–0.2)
BASOPHILS NFR BLD AUTO: 0.1 % — SIGNIFICANT CHANGE UP (ref 0–2)
BLD GP AB SCN SERPL QL: SIGNIFICANT CHANGE UP
BUN SERPL-MCNC: 22.4 MG/DL — HIGH (ref 8–20)
CALCIUM SERPL-MCNC: 9.7 MG/DL — SIGNIFICANT CHANGE UP (ref 8.6–10.2)
CHLORIDE SERPL-SCNC: 98 MMOL/L — SIGNIFICANT CHANGE UP (ref 98–107)
CO2 SERPL-SCNC: 25 MMOL/L — SIGNIFICANT CHANGE UP (ref 22–29)
CREAT SERPL-MCNC: 0.81 MG/DL — SIGNIFICANT CHANGE UP (ref 0.5–1.3)
EOSINOPHIL # BLD AUTO: 0 K/UL — SIGNIFICANT CHANGE UP (ref 0–0.5)
EOSINOPHIL NFR BLD AUTO: 0 % — SIGNIFICANT CHANGE UP (ref 0–6)
ESTIMATED AVERAGE GLUCOSE: 111 MG/DL — SIGNIFICANT CHANGE UP (ref 68–114)
GLUCOSE SERPL-MCNC: 94 MG/DL — SIGNIFICANT CHANGE UP (ref 70–99)
HCT VFR BLD CALC: 45 % — SIGNIFICANT CHANGE UP (ref 34.5–45)
HGB BLD-MCNC: 14.4 G/DL — SIGNIFICANT CHANGE UP (ref 11.5–15.5)
IMM GRANULOCYTES NFR BLD AUTO: 0.7 % — SIGNIFICANT CHANGE UP (ref 0–1.5)
INR BLD: 0.99 RATIO — SIGNIFICANT CHANGE UP (ref 0.88–1.16)
LYMPHOCYTES # BLD AUTO: 2 K/UL — SIGNIFICANT CHANGE UP (ref 1–3.3)
LYMPHOCYTES # BLD AUTO: 26.4 % — SIGNIFICANT CHANGE UP (ref 13–44)
MCHC RBC-ENTMCNC: 31.5 PG — SIGNIFICANT CHANGE UP (ref 27–34)
MCHC RBC-ENTMCNC: 32 GM/DL — SIGNIFICANT CHANGE UP (ref 32–36)
MCV RBC AUTO: 98.5 FL — SIGNIFICANT CHANGE UP (ref 80–100)
MONOCYTES # BLD AUTO: 0.52 K/UL — SIGNIFICANT CHANGE UP (ref 0–0.9)
MONOCYTES NFR BLD AUTO: 6.9 % — SIGNIFICANT CHANGE UP (ref 2–14)
MRSA PCR RESULT.: SIGNIFICANT CHANGE UP
NEUTROPHILS # BLD AUTO: 4.99 K/UL — SIGNIFICANT CHANGE UP (ref 1.8–7.4)
NEUTROPHILS NFR BLD AUTO: 65.9 % — SIGNIFICANT CHANGE UP (ref 43–77)
PLATELET # BLD AUTO: 312 K/UL — SIGNIFICANT CHANGE UP (ref 150–400)
POTASSIUM SERPL-MCNC: 5.5 MMOL/L — HIGH (ref 3.5–5.3)
POTASSIUM SERPL-SCNC: 5.5 MMOL/L — HIGH (ref 3.5–5.3)
PROTHROM AB SERPL-ACNC: 11.5 SEC — SIGNIFICANT CHANGE UP (ref 10.6–13.6)
RBC # BLD: 4.57 M/UL — SIGNIFICANT CHANGE UP (ref 3.8–5.2)
RBC # FLD: 15.1 % — HIGH (ref 10.3–14.5)
S AUREUS DNA NOSE QL NAA+PROBE: SIGNIFICANT CHANGE UP
SODIUM SERPL-SCNC: 135 MMOL/L — SIGNIFICANT CHANGE UP (ref 135–145)
WBC # BLD: 7.57 K/UL — SIGNIFICANT CHANGE UP (ref 3.8–10.5)
WBC # FLD AUTO: 7.57 K/UL — SIGNIFICANT CHANGE UP (ref 3.8–10.5)

## 2021-07-15 PROCEDURE — 71046 X-RAY EXAM CHEST 2 VIEWS: CPT | Mod: 26

## 2021-07-15 PROCEDURE — 93010 ELECTROCARDIOGRAM REPORT: CPT

## 2021-07-15 RX ORDER — SODIUM CHLORIDE 9 MG/ML
3 INJECTION INTRAMUSCULAR; INTRAVENOUS; SUBCUTANEOUS ONCE
Refills: 0 | Status: DISCONTINUED | OUTPATIENT
Start: 2021-07-26 | End: 2021-07-26

## 2021-07-15 NOTE — H&P PST ADULT - NEGATIVE ENMT SYMPTOMS
no hearing difficulty/no ear pain/no tinnitus/no vertigo/no sinus symptoms/no nasal congestion/no nasal discharge/no nasal obstruction/no post-nasal discharge/no throat pain

## 2021-07-15 NOTE — H&P PST ADULT - ENMT COMMENTS
sensation that throat is closing at times unsure if related to anxiety, vocal cord stenosis, sees ENT

## 2021-07-15 NOTE — H&P PST ADULT - HISTORY OF PRESENT ILLNESS
60 year old female pmhx of osteopenia  presents to PST with complaint of low back pain and left hip pain.    She is having intermittent loss of bladder sensation and has urinated herself several times.   Xray of the lumbar spine taken 06/24/2021 demonstrates multiple levels of lumbar degenerative disc disease mainly at L4-L5 and L5-S1, there is loss of lordosis.    MRI obtained from [facility] Demonstrates age-appropriate lumbar spondylosis with moderate to severe stenosis at L4-L5 that is left worse than right, L5-S1 stenosis moderate to severe.  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 Presents to [facility] with complaint of low back pain and left hip pain for the last 9-10 months, describes pain as so severe it takes her breath away, 10+/10 in severity, progressively worsened over the last few months, exacerbated by movement, relieved with otc Solo patch, ice packs, and tylenol. She is having intermittent loss of bladder sensation and has urinated herself several times. Xray of the lumbar spine taken 06/24/2021 demonstrates multiple levels of lumbar degenerative disc disease mainly at L4-L5 and L5-S1, there is loss of lordosis. MRI obtained from [facility] Demonstrates age-appropriate lumbar spondylosis with moderate to severe stenosis at L4-L5 that is left worse than right, L5-S1 stenosis moderate to severe. Patient is scheduled for l4 laminectomy, partial L5-L3 with Dr Duke on 7/26/21.    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 Presents to [facility] with complaint of low back pain and left hip pain for the last 9-10 months, describes pain as so severe it takes her breath away, 10+/10 in severity, progressively worsened over the last few months, exacerbated by movement, relieved with otc Solo patch, ice packs, and tylenol. She is having intermittent loss of bladder sensation and has urinated herself several times. Xray of the lumbar spine taken 06/24/2021 demonstrates multiple levels of lumbar degenerative disc disease mainly at L4-L5 and L5-S1, there is loss of lordosis. MRI obtained from [facility] Demonstrates age-appropriate lumbar spondylosis with moderate to severe stenosis at L4-L5 that is left worse than right, L5-S1 stenosis moderate to severe. Patient is scheduled for L4 laminectomy, partial L5-L3 with Dr Duke on 7/26/21.    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 Presents to [facility] with complaint of low back pain and left hip pain for the last 9-10 months, describes pain as so severe it takes her breath away, 10+/10 in severity, progressively worsened over the last few months, exacerbated by movement, relieved with otc Solo patch, ice packs, and tylenol. She is having intermittent loss of bladder sensation and has urinated herself several times. Xray of the lumbar spine taken 06/24/2021 demonstrates multiple levels of lumbar degenerative disc disease mainly at L4-L5 and L5-S1, there is loss of lordosis. MRI obtained from [facility] Demonstrates age-appropriate lumbar spondylosis with moderate to severe stenosis at L4-L5 that is left worse than right, L5-S1 stenosis moderate to severe. Patient is scheduled for L4 laminectomy, partial L5-L3 with Dr Dkue on 7/26/21.    Medical, cardiac, pulmonary and ENT clearance pending   COVID vaccine series completed

## 2021-07-15 NOTE — H&P PST ADULT - AIRWAY
asthma, stenosis of vocal cord will have ENT evaluation prior to surgery asthma, tracheomalacia, stenosis of vocal cord will have ENT evaluation prior to surgery

## 2021-07-15 NOTE — H&P PST ADULT - ATTENDING COMMENTS
pt presents for lumbar laminectomy re: NC. Pt aware of incomplete resolution of s/sx, incidental durotomy revision surgery

## 2021-07-15 NOTE — H&P PST ADULT - MUSCULOSKELETAL COMMENTS
osteopenia, lumba rback pain, left hip pain osteopenia, lumbar back pain, left hip pain lumbar region

## 2021-07-15 NOTE — H&P PST ADULT - NSICDXPASTSURGICALHX_GEN_ALL_CORE_FT
PAST SURGICAL HISTORY:  H/O foot surgery     H/O ovarian cystectomy     H/O splenectomy     History of tonsillectomy

## 2021-07-15 NOTE — H&P PST ADULT - ASSESSMENT
CAPRINI SCORE    AGE RELATED RISK FACTORS                                                             [ ] Age 41-60 years                                            (1 Point)  [ ] Age: 61-74 years                                           (2 Points)                 [ ] Age= 75 years                                                (3 Points)             DISEASE RELATED RISK FACTORS                                                       [ ] Edema in the lower extremities                 (1 Point)                     [ ] Varicose veins                                               (1 Point)                                 [ ] BMI > 25 Kg/m2                                            (1 Point)                                  [ ] Serious infection (ie PNA)                            (1 Point)                     [ ] Lung disease ( COPD, Emphysema)            (1 Point)                                                                          [ ] Acute myocardial infarction                         (1 Point)                  [ ] Congestive heart failure (in the previous month)  (1 Point)         [ ] Inflammatory bowel disease                            (1 Point)                  [ ] Central venous access, PICC or Port               (2 points)       (within the last month)                                                                [ ] Stroke (in the previous month)                        (5 Points)    [ ] Previous or present malignancy                       (2 points)                                                                                                                                                         HEMATOLOGY RELATED FACTORS                                                         [ ] Prior episodes of VTE                                     (3 Points)                     [ ] Positive family history for VTE                      (3 Points)                  [ ] Prothrombin 44728 A                                     (3 Points)                     [ ] Factor V Leiden                                                (3 Points)                        [ ] Lupus anticoagulants                                      (3 Points)                                                           [ ] Anticardiolipin antibodies                              (3 Points)                                                       [ ] High homocysteine in the blood                   (3 Points)                                             [ ] Other congenital or acquired thrombophilia      (3 Points)                                                [ ] Heparin induced thrombocytopenia                  (3 Points)                                        MOBILITY RELATED FACTORS  [ ] Bed rest                                                         (1 Point)  [ ] Plaster cast                                                    (2 points)  [ ] Bed bound for more than 72 hours           (2 Points)    GENDER SPECIFIC FACTORS  [ ] Pregnancy or had a baby within the last month   (1 Point)  [ ] Post-partum < 6 weeks                                   (1 Point)  [ ] Hormonal therapy  or oral contraception   (1 Point)  [ ] History of pregnancy complications              (1 point)  [ ] Unexplained or recurrent              (1 Point)    OTHER RISK FACTORS                                           (1 Point)  [ ] BMI >40, smoking, diabetes requiring insulin, chemotherapy  blood transfusions and length of surgery over 2 hours    SURGERY RELATED RISK FACTORS  [ ]  Section within the last month     (1 Point)  [ ] Minor surgery                                                  (1 Point)  [ ] Arthroscopic surgery                                       (2 Points)  [ ] Planned major surgery lasting more            (2 Points)      than 45 minutes     [ ] Elective hip or knee joint replacement       (5 points)       surgery                                                TRAUMA RELATED RISK FACTORS  [ ] Fracture of the hip, pelvis, or leg                       (5 Points)  [ ] Spinal cord injury resulting in paralysis             (5 points)       (in the previous month)    [ ] Paralysis  (less than 1 month)                             (5 Points)  [ ] Multiple Trauma within 1 month                        (5 Points)    Total Score [        ]    Caprini Score 0-2: Low Risk, NO VTE prophylaxis required for most patients, encourage ambulation  Caprini Score 3-6: Moderate Risk , pharmacologic VTE prophylaxis is indicated for most patients (in the absence of contraindications)  Caprini Score Greater than or =7: High risk, pharmocologic VTE prophylaxis indicated for most patients (in the absence of contraindications)              OPIOID RISK TOOL    DEBRA EACH BOX THAT APPLIES AND ADD TOTALS AT THE END    FAMILY HISTORY OF SUBSTANCE ABUSE                 FEMALE         MALE                                                Alcohol                             [  ]1 pt          [  ]3pts                                               Illegal Durgs                     [  ]2 pts        [  ]3pts                                               Rx Drugs                           [  ]4 pts        [  ]4 pts    PERSONAL HISTORY OF SUBSTANCE ABUSE                                                                                          Alcohol                             [  ]3 pts       [  ]3 pts                                               Illegal Durgs                     [  ]4 pts        [  ]4 pts                                               Rx Drugs                           [  ]5 pts        [  ]5 pts    AGE BETWEEN 16-45 YEARS                                      [  ]1 pt         [  ]1 pt    HISTORY OF PREADOLESCENT   SEXUAL ABUSE                                                             [  ]3 pts        [  ]0pts    PSYCHOLOGICAL DISEASE                     ADD, OCD, Bipolar, Schizophrenia        [  ]2 pts         [  ]2 pts                      Depression                                               [  ]1 pt           [  ]1 pt           SCORING TOTAL                          A score of 3 or lower indicated LOW risk for future opiod abuse  A score of 4 to 7 indicated moderate risk for future opiod abuse  A score of 8 or higher indicates a high risk for opiod abuse     CAPRINI SCORE    AGE RELATED RISK FACTORS                                                             [x ] Age 41-60 years                                            (1 Point)  [ ] Age: 61-74 years                                           (2 Points)                 [ ] Age= 75 years                                                (3 Points)             DISEASE RELATED RISK FACTORS                                                       [ ] Edema in the lower extremities                 (1 Point)                     [ ] Varicose veins                                               (1 Point)                                 [ x] BMI > 25 Kg/m2                                            (1 Point)                                  [ ] Serious infection (ie PNA)                            (1 Point)                     [x ] Lung disease ( COPD, Emphysema)            (1 Point)                                                                          [ ] Acute myocardial infarction                         (1 Point)                  [ ] Congestive heart failure (in the previous month)  (1 Point)         [ ] Inflammatory bowel disease                            (1 Point)                  [ ] Central venous access, PICC or Port               (2 points)       (within the last month)                                                                [ ] Stroke (in the previous month)                        (5 Points)    [ ] Previous or present malignancy                       (2 points)                                                                                                                                                         HEMATOLOGY RELATED FACTORS                                                         [ ] Prior episodes of VTE                                     (3 Points)                     [ ] Positive family history for VTE                      (3 Points)                  [ ] Prothrombin 37380 A                                     (3 Points)                     [ ] Factor V Leiden                                                (3 Points)                        [ ] Lupus anticoagulants                                      (3 Points)                                                           [ ] Anticardiolipin antibodies                              (3 Points)                                                       [ ] High homocysteine in the blood                   (3 Points)                                             [ ] Other congenital or acquired thrombophilia      (3 Points)                                                [ ] Heparin induced thrombocytopenia                  (3 Points)                                        MOBILITY RELATED FACTORS  [ ] Bed rest                                                         (1 Point)  [ ] Plaster cast                                                    (2 points)  [ ] Bed bound for more than 72 hours           (2 Points)    GENDER SPECIFIC FACTORS  [ ] Pregnancy or had a baby within the last month   (1 Point)  [ ] Post-partum < 6 weeks                                   (1 Point)  [ ] Hormonal therapy  or oral contraception   (1 Point)  [ ] History of pregnancy complications              (1 point)  [ ] Unexplained or recurrent              (1 Point)    OTHER RISK FACTORS                                           (1 Point)  [ ] BMI >40, smoking, diabetes requiring insulin, chemotherapy  blood transfusions and length of surgery over 2 hours    SURGERY RELATED RISK FACTORS  [ ]  Section within the last month     (1 Point)  [ ] Minor surgery                                                  (1 Point)  [ ] Arthroscopic surgery                                       (2 Points)  [ x] Planned major surgery lasting more            (2 Points)      than 45 minutes     [ ] Elective hip or knee joint replacement       (5 points)       surgery                                                TRAUMA RELATED RISK FACTORS  [ ] Fracture of the hip, pelvis, or leg                       (5 Points)  [ ] Spinal cord injury resulting in paralysis             (5 points)       (in the previous month)    [ ] Paralysis  (less than 1 month)                             (5 Points)  [ ] Multiple Trauma within 1 month                        (5 Points)    Total Score [  5      ]    Caprini Score 0-2: Low Risk, NO VTE prophylaxis required for most patients, encourage ambulation  Caprini Score 3-6: Moderate Risk , pharmacologic VTE prophylaxis is indicated for most patients (in the absence of contraindications)  Caprini Score Greater than or =7: High risk, pharmocologic VTE prophylaxis indicated for most patients (in the absence of contraindications)              OPIOID RISK TOOL    DEBRA EACH BOX THAT APPLIES AND ADD TOTALS AT THE END    FAMILY HISTORY OF SUBSTANCE ABUSE                 FEMALE         MALE                                                Alcohol                             [  ]1 pt          [  ]3pts                                               Illegal Durgs                     [  ]2 pts        [  ]3pts                                               Rx Drugs                           [  ]4 pts        [  ]4 pts    PERSONAL HISTORY OF SUBSTANCE ABUSE                                                                                          Alcohol                             [  ]3 pts       [  ]3 pts                                               Illegal Durgs                     [  ]4 pts        [  ]4 pts                                               Rx Drugs                           [  ]5 pts        [  ]5 pts    AGE BETWEEN 16-45 YEARS                                      [  ]1 pt         [  ]1 pt    HISTORY OF PREADOLESCENT   SEXUAL ABUSE                                                             [  ]3 pts        [  ]0pts    PSYCHOLOGICAL DISEASE                     ADD, OCD, Bipolar, Schizophrenia        [  ]2 pts         [  ]2 pts                      Depression                                               [  ]1 pt           [  ]1 pt           SCORING TOTAL         0                 A score of 3 or lower indicated LOW risk for future opiod abuse  A score of 4 to 7 indicated moderate risk for future opiod abuse  A score of 8 or higher indicates a high risk for opiod abuse    60 year old female pmhx of asthma last exacerbation winter of 2020 was hospitalized for 14 days, uses rescue inhaler 1x per month on average, tracheomalacia, glottis stenosis, sarcoidosis, pulmonary stenosis, tachycardia, osteopenia, hand tremors, migraines, 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 Presents to PST with complaint of low back pain and left hip pain for the last 9-10 months, describes pain as so severe it takes her breath away, 10+/10 in severity, progressively worsened over the last few months, exacerbated by movement, relieved with otc Solo patch, ice packs, and tylenol. She is having intermittent loss of bladder sensation and has urinated herself several times. Xray of the lumbar spine taken 2021 demonstrates multiple levels of lumbar degenerative disc disease mainly at L4-L5 and L5-S1, there is loss of lordosis. MRI obtained from [facility] Demonstrates age-appropriate lumbar spondylosis with moderate to severe stenosis at L4-L5 that is left worse than right, L5-S1 stenosis moderate to severe. Patient is scheduled for L4 laminectomy, partial L5-L3 with Dr Duke on 21. Patient educated on surgical scrub, preadmission instructions, medical, cardiac, pulmonary and ENT clearance and day of procedure medications, verbalizes understanding. Pt instructed to stop vitamins/supplements/herbal medications/ASA/NSAIDS for one week prior to surgery and discuss with PMD.

## 2021-07-15 NOTE — H&P PST ADULT - NSICDXFAMILYHX_GEN_ALL_CORE_FT
FAMILY HISTORY:  Father  Still living? Unknown  FH: heart disease, Age at diagnosis: Age Unknown  FH: multiple myeloma, Age at diagnosis: Age Unknown    Mother  Still living? Unknown  FH: breast cancer, Age at diagnosis: Age Unknown  FH: leukemia, Age at diagnosis: Age Unknown

## 2021-07-15 NOTE — H&P PST ADULT - OTHER CARE PROVIDERS
Dr. Wilkins [phone], Luis Alberto puga [phone], Leonel [phone] Arturo BAHENA [phone]

## 2021-07-15 NOTE — H&P PST ADULT - NSICDXPASTMEDICALHX_GEN_ALL_CORE_FT
PAST MEDICAL HISTORY:  COVID-19 vaccine series completed     Lumbar stenosis with neurogenic claudication

## 2021-07-16 NOTE — PATIENT PROFILE ADULT - NSASFALLNEEDSASSIST_GEN_A_NUR
Rx denied.  rx for sildenafil 20 mg sent to pharmacy per urology on 7/1/21.    Nita MARTINS RN  EP Triage     no

## 2021-07-19 RX ORDER — CEFAZOLIN SODIUM 1 G
2000 VIAL (EA) INJECTION ONCE
Refills: 0 | Status: DISCONTINUED | OUTPATIENT
Start: 2021-07-26 | End: 2021-07-26

## 2021-07-19 RX ORDER — ACETAMINOPHEN 500 MG
975 TABLET ORAL ONCE
Refills: 0 | Status: COMPLETED | OUTPATIENT
Start: 2021-07-26 | End: 2021-07-26

## 2021-07-19 RX ORDER — APREPITANT 80 MG/1
40 CAPSULE ORAL ONCE
Refills: 0 | Status: COMPLETED | OUTPATIENT
Start: 2021-07-26 | End: 2021-07-26

## 2021-07-22 ENCOUNTER — APPOINTMENT (OUTPATIENT)
Dept: OTOLARYNGOLOGY | Facility: CLINIC | Age: 61
End: 2021-07-22
Payer: MEDICAID

## 2021-07-22 PROCEDURE — 99214 OFFICE O/P EST MOD 30 MIN: CPT | Mod: 25

## 2021-07-22 PROCEDURE — 31579 LARYNGOSCOPY TELESCOPIC: CPT

## 2021-07-22 PROCEDURE — 99072 ADDL SUPL MATRL&STAF TM PHE: CPT

## 2021-07-22 NOTE — HISTORY OF PRESENT ILLNESS
[de-identified] : 60yo F here for f/u laryngospasm and coughing. Found incredible mold in her house as part of moving to [place]. \par Back surgery pending for monday. Had covid in Dec. 2 months ago had uti, urinary incontinence, lumbar stenosis. Worried about anesthesia. +h/o tracheomalacia. Here for airway clearance. Breathing has been ok. Got adjustable bed which has helped (purple bed). Has stable increased snorting cough. More so positional. Mild improvement from previous visit. Taking famotidine nightly. Doing nasal sprays as well. Voice has been hoarse intermittently.  \par Did not do injections. \par has h/o migraine.\par h/o sarcoid, fibromyalgia\par \par \par

## 2021-07-22 NOTE — CONSULT LETTER
[Dear  ___] : Dear  [unfilled], [Consult Letter:] : I had the pleasure of evaluating your patient, [unfilled]. [Please see my note below.] : Please see my note below. [Consult Closing:] : Thank you very much for allowing me to participate in the care of this patient.  If you have any questions, please do not hesitate to contact me. [Sincerely,] : Sincerely, [FreeTextEntry3] : Davon Morris MD, PhD\par Chief, Division of Laryngology\par Department of Otolaryngology\par [facility]\par Pediatric Otolaryngology, [facility]\par  of Otolaryngology\par [facility] [employer] [facility]\par \par \par  [DrPepe  ___] : Dr. ALVARES

## 2021-07-23 ENCOUNTER — APPOINTMENT (OUTPATIENT)
Dept: PULMONOLOGY | Facility: CLINIC | Age: 61
End: 2021-07-23
Payer: MEDICAID

## 2021-07-23 VITALS — WEIGHT: 200 LBS | HEART RATE: 114 BPM | BODY MASS INDEX: 32.14 KG/M2 | HEIGHT: 66 IN | TEMPERATURE: 99 F

## 2021-07-23 PROCEDURE — 99214 OFFICE O/P EST MOD 30 MIN: CPT | Mod: 25

## 2021-07-23 PROCEDURE — ZZZZZ: CPT

## 2021-07-23 PROCEDURE — 94726 PLETHYSMOGRAPHY LUNG VOLUMES: CPT

## 2021-07-23 PROCEDURE — 94729 DIFFUSING CAPACITY: CPT

## 2021-07-23 PROCEDURE — 99072 ADDL SUPL MATRL&STAF TM PHE: CPT

## 2021-07-23 PROCEDURE — 94010 BREATHING CAPACITY TEST: CPT

## 2021-07-23 NOTE — ASSESSMENT
[FreeTextEntry1] : Patient is cleared from a pulmonary perspective. Should continue her controller meds perioperatively. Lung function today is normal\par \par \par \par Pt has tried Wixela without gppd therapeutoc response and control of her asthma. Would benefit from resuminng  Breo ellipta. \par

## 2021-07-23 NOTE — CONSULT LETTER
[Consult Letter:] : I had the pleasure of evaluating your patient, [unfilled]. [Please see my note below.] : Please see my note below. [Consult Closing:] : Thank you very much for allowing me to participate in the care of this patient.  If you have any questions, please do not hesitate to contact me. [Sincerely,] : Sincerely, [Dear  ___] : Dear  [unfilled], [DrPepe  ___] : Dr. ALVARES [FreeTextEntry2] : Dr. Duke\par  [FreeTextEntry3] : Chad Bob MD\par [facility] [place]\par [address]\[place] [address]\par [phone]\par

## 2021-07-23 NOTE — HISTORY OF PRESENT ILLNESS
[TextBox_4] : Pt here for follow up asthma and pre-op clearance for spinal surgery, She was recently changed to Wixcela ^ non coverage of Breo. She reports increased chest tightness and SOB since changing inhaler. \par Had several courses of prednisone for asthma exacerbations this year.

## 2021-07-25 ENCOUNTER — TRANSCRIPTION ENCOUNTER (OUTPATIENT)
Age: 61
End: 2021-07-25

## 2021-07-26 ENCOUNTER — TRANSCRIPTION ENCOUNTER (OUTPATIENT)
Age: 61
End: 2021-07-26

## 2021-07-26 ENCOUNTER — INPATIENT (INPATIENT)
Facility: HOSPITAL | Age: 61
LOS: 3 days | Discharge: HOME | DRG: 518 | End: 2021-07-30
Attending: ORTHOPAEDIC SURGERY | Admitting: ORTHOPAEDIC SURGERY
Payer: MEDICAID

## 2021-07-26 ENCOUNTER — RESULT REVIEW (OUTPATIENT)
Age: 61
End: 2021-07-26

## 2021-07-26 ENCOUNTER — APPOINTMENT (OUTPATIENT)
Dept: ORTHOPEDIC SURGERY | Facility: HOSPITAL | Age: 61
End: 2021-07-26

## 2021-07-26 VITALS
WEIGHT: 198.42 LBS | TEMPERATURE: 98 F | SYSTOLIC BLOOD PRESSURE: 143 MMHG | HEIGHT: 66 IN | HEART RATE: 70 BPM | OXYGEN SATURATION: 100 % | DIASTOLIC BLOOD PRESSURE: 76 MMHG | RESPIRATION RATE: 15 BRPM

## 2021-07-26 DIAGNOSIS — M48.062 SPINAL STENOSIS, LUMBAR REGION WITH NEUROGENIC CLAUDICATION: ICD-10-CM

## 2021-07-26 DIAGNOSIS — Z90.89 ACQUIRED ABSENCE OF OTHER ORGANS: Chronic | ICD-10-CM

## 2021-07-26 DIAGNOSIS — Z98.890 OTHER SPECIFIED POSTPROCEDURAL STATES: Chronic | ICD-10-CM

## 2021-07-26 DIAGNOSIS — M48.061 SPINAL STENOSIS, LUMBAR REGION WITHOUT NEUROGENIC CLAUDICATION: ICD-10-CM

## 2021-07-26 DIAGNOSIS — Z90.81 ACQUIRED ABSENCE OF SPLEEN: Chronic | ICD-10-CM

## 2021-07-26 LAB
ABO RH CONFIRMATION: SIGNIFICANT CHANGE UP
ANION GAP SERPL CALC-SCNC: 11 MMOL/L — SIGNIFICANT CHANGE UP (ref 5–17)
BUN SERPL-MCNC: 16.6 MG/DL — SIGNIFICANT CHANGE UP (ref 8–20)
CALCIUM SERPL-MCNC: 9.6 MG/DL — SIGNIFICANT CHANGE UP (ref 8.6–10.2)
CHLORIDE SERPL-SCNC: 104 MMOL/L — SIGNIFICANT CHANGE UP (ref 98–107)
CO2 SERPL-SCNC: 27 MMOL/L — SIGNIFICANT CHANGE UP (ref 22–29)
CREAT SERPL-MCNC: 0.7 MG/DL — SIGNIFICANT CHANGE UP (ref 0.5–1.3)
GLUCOSE SERPL-MCNC: 103 MG/DL — HIGH (ref 70–99)
POTASSIUM SERPL-MCNC: 4.2 MMOL/L — SIGNIFICANT CHANGE UP (ref 3.5–5.3)
POTASSIUM SERPL-SCNC: 4.2 MMOL/L — SIGNIFICANT CHANGE UP (ref 3.5–5.3)
SODIUM SERPL-SCNC: 142 MMOL/L — SIGNIFICANT CHANGE UP (ref 135–145)

## 2021-07-26 PROCEDURE — 93010 ELECTROCARDIOGRAM REPORT: CPT

## 2021-07-26 RX ORDER — ALBUTEROL 90 UG/1
2.5 AEROSOL, METERED ORAL EVERY 6 HOURS
Refills: 0 | Status: DISCONTINUED | OUTPATIENT
Start: 2021-07-26 | End: 2021-07-27

## 2021-07-26 RX ORDER — MAGNESIUM HYDROXIDE 400 MG/1
30 TABLET, CHEWABLE ORAL EVERY 12 HOURS
Refills: 0 | Status: DISCONTINUED | OUTPATIENT
Start: 2021-07-26 | End: 2021-07-30

## 2021-07-26 RX ORDER — DILTIAZEM HCL 120 MG
120 CAPSULE, EXT RELEASE 24 HR ORAL DAILY
Refills: 0 | Status: DISCONTINUED | OUTPATIENT
Start: 2021-07-26 | End: 2021-07-28

## 2021-07-26 RX ORDER — SODIUM CHLORIDE 9 MG/ML
1000 INJECTION, SOLUTION INTRAVENOUS
Refills: 0 | Status: DISCONTINUED | OUTPATIENT
Start: 2021-07-26 | End: 2021-07-26

## 2021-07-26 RX ORDER — PANTOPRAZOLE SODIUM 20 MG/1
40 TABLET, DELAYED RELEASE ORAL
Refills: 0 | Status: DISCONTINUED | OUTPATIENT
Start: 2021-07-26 | End: 2021-07-30

## 2021-07-26 RX ORDER — ALPRAZOLAM 0.25 MG
0.5 TABLET ORAL EVERY 8 HOURS
Refills: 0 | Status: DISCONTINUED | OUTPATIENT
Start: 2021-07-26 | End: 2021-07-30

## 2021-07-26 RX ORDER — FENTANYL CITRATE 50 UG/ML
25 INJECTION INTRAVENOUS
Refills: 0 | Status: DISCONTINUED | OUTPATIENT
Start: 2021-07-26 | End: 2021-07-26

## 2021-07-26 RX ORDER — VALACYCLOVIR 500 MG/1
500 TABLET, FILM COATED ORAL DAILY
Refills: 0 | Status: DISCONTINUED | OUTPATIENT
Start: 2021-07-26 | End: 2021-07-30

## 2021-07-26 RX ORDER — ACETAMINOPHEN 500 MG
975 TABLET ORAL EVERY 6 HOURS
Refills: 0 | Status: DISCONTINUED | OUTPATIENT
Start: 2021-07-26 | End: 2021-07-30

## 2021-07-26 RX ORDER — CEFAZOLIN SODIUM 1 G
2000 VIAL (EA) INJECTION
Refills: 0 | Status: COMPLETED | OUTPATIENT
Start: 2021-07-26 | End: 2021-07-27

## 2021-07-26 RX ORDER — METHOCARBAMOL 500 MG/1
750 TABLET, FILM COATED ORAL THREE TIMES A DAY
Refills: 0 | Status: DISCONTINUED | OUTPATIENT
Start: 2021-07-26 | End: 2021-07-30

## 2021-07-26 RX ORDER — FLUTICASONE PROPIONATE 50 MCG
1 SPRAY, SUSPENSION NASAL
Refills: 0 | Status: DISCONTINUED | OUTPATIENT
Start: 2021-07-26 | End: 2021-07-30

## 2021-07-26 RX ORDER — SODIUM CHLORIDE 9 MG/ML
1000 INJECTION, SOLUTION INTRAVENOUS
Refills: 0 | Status: DISCONTINUED | OUTPATIENT
Start: 2021-07-26 | End: 2021-07-30

## 2021-07-26 RX ORDER — ALBUTEROL 90 UG/1
1 AEROSOL, METERED ORAL EVERY 4 HOURS
Refills: 0 | Status: DISCONTINUED | OUTPATIENT
Start: 2021-07-26 | End: 2021-07-27

## 2021-07-26 RX ORDER — PRIMIDONE 250 MG/1
50 TABLET ORAL DAILY
Refills: 0 | Status: DISCONTINUED | OUTPATIENT
Start: 2021-07-26 | End: 2021-07-30

## 2021-07-26 RX ORDER — FENTANYL CITRATE 50 UG/ML
50 INJECTION INTRAVENOUS
Refills: 0 | Status: DISCONTINUED | OUTPATIENT
Start: 2021-07-26 | End: 2021-07-26

## 2021-07-26 RX ORDER — ONDANSETRON 8 MG/1
4 TABLET, FILM COATED ORAL ONCE
Refills: 0 | Status: DISCONTINUED | OUTPATIENT
Start: 2021-07-26 | End: 2021-07-26

## 2021-07-26 RX ORDER — MONTELUKAST 4 MG/1
10 TABLET, CHEWABLE ORAL DAILY
Refills: 0 | Status: DISCONTINUED | OUTPATIENT
Start: 2021-07-26 | End: 2021-07-30

## 2021-07-26 RX ORDER — BUDESONIDE AND FORMOTEROL FUMARATE DIHYDRATE 160; 4.5 UG/1; UG/1
2 AEROSOL RESPIRATORY (INHALATION)
Refills: 0 | Status: DISCONTINUED | OUTPATIENT
Start: 2021-07-26 | End: 2021-07-27

## 2021-07-26 RX ORDER — ONDANSETRON 8 MG/1
4 TABLET, FILM COATED ORAL EVERY 6 HOURS
Refills: 0 | Status: DISCONTINUED | OUTPATIENT
Start: 2021-07-26 | End: 2021-07-30

## 2021-07-26 RX ORDER — DILTIAZEM HCL 120 MG
120 CAPSULE, EXT RELEASE 24 HR ORAL DAILY
Refills: 0 | Status: DISCONTINUED | OUTPATIENT
Start: 2021-07-26 | End: 2021-07-26

## 2021-07-26 RX ORDER — SENNA PLUS 8.6 MG/1
2 TABLET ORAL AT BEDTIME
Refills: 0 | Status: DISCONTINUED | OUTPATIENT
Start: 2021-07-26 | End: 2021-07-30

## 2021-07-26 RX ORDER — ASPIRIN/CALCIUM CARB/MAGNESIUM 324 MG
81 TABLET ORAL DAILY
Refills: 0 | Status: DISCONTINUED | OUTPATIENT
Start: 2021-07-26 | End: 2021-07-30

## 2021-07-26 RX ADMIN — Medication 975 MILLIGRAM(S): at 17:50

## 2021-07-26 RX ADMIN — FENTANYL CITRATE 50 MICROGRAM(S): 50 INJECTION INTRAVENOUS at 18:01

## 2021-07-26 RX ADMIN — PRIMIDONE 50 MILLIGRAM(S): 250 TABLET ORAL at 23:32

## 2021-07-26 RX ADMIN — Medication 0.5 MILLIGRAM(S): at 22:12

## 2021-07-26 RX ADMIN — MONTELUKAST 10 MILLIGRAM(S): 4 TABLET, CHEWABLE ORAL at 23:32

## 2021-07-26 RX ADMIN — Medication 100 MILLIGRAM(S): at 22:11

## 2021-07-26 RX ADMIN — FENTANYL CITRATE 50 MICROGRAM(S): 50 INJECTION INTRAVENOUS at 18:34

## 2021-07-26 RX ADMIN — METHOCARBAMOL 750 MILLIGRAM(S): 500 TABLET, FILM COATED ORAL at 22:11

## 2021-07-26 RX ADMIN — Medication 975 MILLIGRAM(S): at 11:09

## 2021-07-26 RX ADMIN — Medication 975 MILLIGRAM(S): at 22:12

## 2021-07-26 RX ADMIN — APREPITANT 40 MILLIGRAM(S): 80 CAPSULE ORAL at 11:08

## 2021-07-26 RX ADMIN — FENTANYL CITRATE 50 MICROGRAM(S): 50 INJECTION INTRAVENOUS at 18:40

## 2021-07-26 RX ADMIN — FENTANYL CITRATE 50 MICROGRAM(S): 50 INJECTION INTRAVENOUS at 18:33

## 2021-07-26 RX ADMIN — SENNA PLUS 2 TABLET(S): 8.6 TABLET ORAL at 22:12

## 2021-07-26 NOTE — CHART NOTE - NSCHARTNOTEFT_GEN_A_CORE
Pre-op:  -H&P, clearances (including ENT, pulm, cardiac), ROS, PE (including airway exam), labs, EKG, imaging reviewed  -R/B/A discussed at length; all questions answered; consent obtained    Intra-op:  -Case began by colleague  -Standard ASA monitors placed  -Pre-O2 x 5 minutes  -Smooth IV induction, stable throughout  -Eyes taped  -Easy intubation (Grade 1 view), atraumatic, teeth intact, +ET-CO2, +b/l breath sounds; size 6.5 ETT used (inserted smoothly)  -Positioned w/ pillows and padding; monitored throughout case  -Stable throughout case  -Reversed after 4/4 twitches  -Extubated when met full criteria (ToF, head lift, following commands)  -Teeth intact    Post-op:  -Transported to PACU w/ O2 via NC  -Stable, comfortable, following commands, moving all extremities  -Handoff given at bedside Pre-op:  -H&P, clearances (including ENT, pulm, cardiac), ROS, PE (including airway exam), labs, EKG, imaging reviewed  -R/B/A discussed at length; all questions answered; consent obtained    Intra-op:  -Standard ASA monitors placed  -Pre-O2 x 5 minutes  -Smooth IV induction, stable throughout  -Eyes taped  -Easy intubation (Grade 1 view), atraumatic, teeth intact, +ET-CO2, +b/l breath sounds; size 6.5 ETT used (inserted smoothly)  -Positioned w/ pillows and padding; monitored throughout case  -Stable throughout case  -Reversed after 4/4 twitches  -Extubated when met full criteria (ToF, head lift, following commands)  -Teeth intact    Post-op:  -Transported to PACU w/ O2 via NC  -Stable, comfortable, following commands, moving all extremities  -Handoff given at bedside Pre-op:  -H&P, clearances (including ENT, pulm, cardiac), ROS, PE (including airway exam), labs, EKG, imaging reviewed  -R/B/A discussed at length; all questions answered; consent obtained    Intra-op:  -Standard ASA monitors placed  -Pre-O2 x 5 minutes  -Smooth IV induction, stable throughout  -Eyes taped  -Easy intubation (Grade 1 view), atraumatic, teeth intact, +ET-CO2, +b/l breath sounds; size 6.5 ETT used (inserted smoothly)  -Positioned w/ pillows and padding; monitored throughout case  -Stable throughout case  -Extubated when met full criteria (ToF, head lift, following commands)  -Teeth intact    Post-op:  -Transported to PACU w/ O2 via NC  -Stable, comfortable, following commands, moving all extremities  -Handoff given at bedside

## 2021-07-26 NOTE — DISCHARGE NOTE PROVIDER - CARE PROVIDER_API CALL
Richard Duke (DO)  Orthopaedic Surgery  [address] [place] [address], [address]  [place] [address]  Phone: [phone]  Fax: [phone]  Follow Up Time:

## 2021-07-26 NOTE — BRIEF OPERATIVE NOTE - NSICDXBRIEFPOSTOP_GEN_ALL_CORE_FT
POST-OP DIAGNOSIS:  Lumbar stenosis with neurogenic claudication 26-Jul-2021 12:37:13  Richard Duke  
POST-OP DIAGNOSIS:  Lumbar stenosis with neurogenic claudication 26-Jul-2021 12:37:13  Richard Duke

## 2021-07-26 NOTE — DISCHARGE NOTE PROVIDER - HOSPITAL COURSE
Patient was admitted s/p lumbar laminectomy L3, L4, L5 for lumbar stenosis with neurogenic claudication on 7/26/21. The hospital post operative course was uneventful.  The surgical dressing was changed uneventfully.   PT/OT worked with patient for gait training.   Pain was now adequately controlled and patient was discharged home in stable condition.  Chronic medical conditions were treated during the hospital stay.

## 2021-07-26 NOTE — DISCHARGE NOTE PROVIDER - NSDCMRMEDTOKEN_GEN_ALL_CORE_FT
Acidophilus oral tablet:   Aimovig SureClick Autoinjector 140 mg/mL subcutaneous solution: subcutaneous once a month  azelastine nasal:   Breo Ellipta 200 mcg-25 mcg/inh inhalation powder: 1 puff(s) inhaled once a day  dilTIAZem 120 mg oral tablet: orally once a day  Marlene-C:   famotidine 40 mg oral tablet: 1 tab(s) orally once a day (at bedtime)  Fasenra Pen 30 mg/mL subcutaneous solution: subcutaneous every 2 months  fluticasone 50 mcg/inh inhalation powder: 1 puff(s) inhaled 2 times a day  Incruse Ellipta 62.5 mcg/inh inhalation powder: 1 puff(s) inhaled every 24 hours  levalbuterol:   montelukast 10 mg oral tablet: 1 tab(s) orally once a day  pantoprazole 40 mg oral delayed release tablet: 1 tab(s) orally 2 times a day  primidone 50 mg oral tablet: orally once a day  valACYclovir 500 mg oral tablet: 1 tab(s) orally once a day  Xanax:   Zinc:    acetaminophen 325 mg oral tablet: 3 tab(s) orally every 6 hours  Acidophilus oral tablet:   Aimovig SureClick Autoinjector 140 mg/mL subcutaneous solution: subcutaneous once a month  aspirin 81 mg oral delayed release tablet: 1 tab(s) orally once a day  azelastine nasal:   Breo Ellipta 200 mcg-25 mcg/inh inhalation powder: 1 puff(s) inhaled once a day  dilTIAZem 120 mg oral tablet: orally once a day  Marlene-C:   famotidine 40 mg oral tablet: 1 tab(s) orally once a day (at bedtime)  Fasenra Pen 30 mg/mL subcutaneous solution: subcutaneous every 2 months  fluticasone 50 mcg/inh inhalation powder: 1 puff(s) inhaled 2 times a day  Incruse Ellipta 62.5 mcg/inh inhalation powder: 1 puff(s) inhaled every 24 hours  levalbuterol:   methocarbamol 750 mg oral tablet: 1 tab(s) orally 3 times a day, As Needed for spasm MDD:3   montelukast 10 mg oral tablet: 1 tab(s) orally once a day  pantoprazole 40 mg oral delayed release tablet: 1 tab(s) orally 2 times a day  primidone 50 mg oral tablet: orally once a day  Senna S 50 mg-8.6 mg oral tablet: 2 tab(s) orally once a day (at bedtime)   traMADol 50 mg oral tablet: 1 tab(s) orally every 4 to 6 hours, As Needed MDD:6  valACYclovir 500 mg oral tablet: 1 tab(s) orally once a day  Xanax:   Zinc:

## 2021-07-26 NOTE — PROGRESS NOTE ADULT - SUBJECTIVE AND OBJECTIVE BOX
NELDA [id number]    60yFemale  Spinal stenosis of lumbar region with neurogenic claudication    FH: breast cancer (Mother)    FH: leukemia (Mother)    FH: multiple myeloma (Father)    FH: heart disease (Father)    Lumbar stenosis with neurogenic claudication    COVID-19 vaccine series completed    Tracheomalacia    Glottic stenosis    Asthma    Osteopenia    Pulmonary stenosis    Sarcoidosis    Lumbar stenosis with neurogenic claudication    Lumbar canal stenosis    Lumbar stenosis    Laminectomy and microdiscectomy, lumbar    H/O splenectomy    History of tonsillectomy    H/O foot surgery    H/O ovarian cystectomy      STATUS POST:  lumbar laminectomy L3, L4, L5 for lumbar stenosis with neurogenic claudication  SUBJECTIVE: Patient seen and examined doing well    Back Pain controlled, lower extremity pain resolving    OBJECTIVE:   T(C): 36.7 (07-26-21 @ 10:41), Max: 36.7 (07-26-21 @ 10:41)  HR: 92 (07-26-21 @ 13:59) (70 - 92)  BP: 110/60 (07-26-21 @ 13:59) (110/60 - 143/76)  RR: 14 (07-26-21 @ 13:59) (14 - 15)  SpO2: 100% (07-26-21 @ 13:59) (100% - 100%)   Constitutional: Pleasant in no acute distress  Psych:A&Ox3  Abdominal: soft and supple non distended  Lymphatics: no pretibial pitting edema  Spine:          Dressing:  clean/dry/intact                no drain               Sensation:            Upper extremity          grossly intact manually          Lower extremity           grossly intact manually                               Motor:         Lower extremity                       HF(l2)   KE(l3)    TA(l4)   EHL(l5)  GS(s1)                                                 R        5/5        5/5        5/5       5/5         5/5                                               L         5/5        5/5       5/5       5/5          5/5                                                        [x] warm well perfused; capillary refill <3 seconds                A/P : 60yFemale   S/P lumbar laminectomy as above POD#0  -    Pain control- multimodal  -    DVT ppx: [ x]SCDs[ x] Pharmacolgic ASA 81 mg once daily    -    Periop abx:  Ancef [x ]    -    Check AM labs    -   change dressing  as needed if soiled.  If well maintained, dressing can be changed in the office in one week.     -  Resume home meds as appropriate  -PT/OT WBAT, balance and gait  - LSO with OOB not ordered/not mandatory.  Ortho/PT team can reevaluate possible benefit for additional external support daily, in post op period.   -medical follow up Dr Angulo  - anxiety- restart xanax prn  - asthma- nebs as prior to surgery  - migraine- continue rx  - probable home tomorrow NELDA [id number]    60yFemale  Spinal stenosis of lumbar region with neurogenic claudication    FH: breast cancer (Mother)    FH: leukemia (Mother)    FH: multiple myeloma (Father)    FH: heart disease (Father)    Lumbar stenosis with neurogenic claudication    COVID-19 vaccine series completed    Tracheomalacia    Glottic stenosis    Asthma    Osteopenia    Pulmonary stenosis    Sarcoidosis    Lumbar stenosis with neurogenic claudication    Lumbar canal stenosis    Lumbar stenosis    Laminectomy and microdiscectomy, lumbar    H/O splenectomy    History of tonsillectomy    H/O foot surgery    H/O ovarian cystectomy      STATUS POST:  lumbar laminectomy L3, L4, L5 for lumbar stenosis with neurogenic claudication  SUBJECTIVE: Patient seen and examined doing well    Back Pain controlled, lower extremity pain resolving    OBJECTIVE:   T(C): 36.7 (07-26-21 @ 10:41), Max: 36.7 (07-26-21 @ 10:41)  HR: 92 (07-26-21 @ 13:59) (70 - 92)  BP: 110/60 (07-26-21 @ 13:59) (110/60 - 143/76)  RR: 14 (07-26-21 @ 13:59) (14 - 15)  SpO2: 100% (07-26-21 @ 13:59) (100% - 100%)   Constitutional: Pleasant in no acute distress  Psych:A&Ox3  Abdominal: soft and supple non distended  Lymphatics: no pretibial pitting edema  Spine:          Dressing:  clean/dry/intact                no drain               Sensation:            Upper extremity          grossly intact manually          Lower extremity           grossly intact manually                               Motor:         Lower extremity                       HF(l2)   KE(l3)    TA(l4)   EHL(l5)  GS(s1)                                                 R        5/5        5/5        5/5       5/5         5/5                                               L         5/5        5/5       5/5       5/5          5/5                                                        [x] warm well perfused; capillary refill <3 seconds                A/P : 60yFemale   S/P lumbar laminectomy as above POD#0  -    Pain control- multimodal  -    DVT ppx: [ x]SCDs[ x] Pharmacolgic ASA 81 mg once daily    -    Periop abx:  Ancef [x ]    -    Check AM labs    -   change dressing  as needed if soiled.  If well maintained, dressing can be changed in the office in one week.     -  Resume home meds as appropriate  -PT/OT WBAT, balance and gait  - LSO with OOB not ordered/not mandatory.  Ortho/PT team can reevaluate possible benefit for additional external support daily, in post op period.   -medical follow up Dr Angulo  - anxiety- restart xanax prn  - asthma- nebs prn, inhalers as prior to surgery  - migraine- continue rx  HTN - restart diltiazem with parameters.  DASH diet  - probable home tomorrow

## 2021-07-26 NOTE — BRIEF OPERATIVE NOTE - OPERATION/FINDINGS
severe stenosis
General anesthesia was induced and then I personally assisted all aspects of positioning prone for a posterior lumbar approach on a modular Aidan table.  Lumbar spine was cleansed with Betadine by me and subsequently cleansed with DuraPrep by RN.  I marked L3 with the use of C-arm.  I participated in positioning of blue drapes and ioban and participated in operative timeout. A longitudinal incision was based over the caudal aspect of the L3 vertebral body to the cephalad aspect of the L5, we delivered cumulative dose to 20 mL of 1% lidocaine as a local anesthetic agent. I was responsible for sequential use of hand-held retractors as well as cerebellar retractors to gain access to the spinous processes.  I assisted in dissection to the spinous processes to medial borders of the corresponding facets and bilateral lamina at L3, L4, L5 on patient's right side at which time fluoroscopic imaging confirmed the L4 spinous process which was marked.   I then assisted with Laminectomy at L3, L4,L5.  I provided visualization and assisted with hemostasis throughout the procedure by using sequential retraction, continuous suction, utilization of Surgi-Clarence and packing with Ray-Ting, Bovie cauterization. I assisted with repair incidental durotomy.The surgical area was irrigated copiously, applied duraseal prior to closure.  I personally assisted in Deep fascial closure was conducted with 0 Vicryl.  I personally perfomred closure of superficial fascia with 2-0 Vicryl, Monocryl, Dermabond, longitudinal Steri-Strips. I placed drain stitch.  Surgical site was cleansed with normal saline dried and a dry sterile dressing in the form of Mepilex was placed over the surgical incision.  Xeroform, 2 x 2, Tegaderm was placed over the drain site and an island dressing was placed over the entire incisional area.   I placed 40 cc Marcaine.

## 2021-07-26 NOTE — BRIEF OPERATIVE NOTE - NSICDXBRIEFPROCEDURE_GEN_ALL_CORE_FT
PROCEDURES:  Laminectomy and microdiscectomy, lumbar 26-Jul-2021 12:35:53  Richard Duke  
PROCEDURES:  Laminectomy and microdiscectomy, lumbar 26-Jul-2021 12:35:53  Richard Duke

## 2021-07-26 NOTE — BRIEF OPERATIVE NOTE - NSICDXBRIEFPREOP_GEN_ALL_CORE_FT
PRE-OP DIAGNOSIS:  Lumbar canal stenosis 26-Jul-2021 12:36:53  Richard Duke  
PRE-OP DIAGNOSIS:  Lumbar canal stenosis 26-Jul-2021 12:36:53  Richard Duke

## 2021-07-26 NOTE — DISCHARGE NOTE PROVIDER - NSDCFUADDINST_GEN_ALL_CORE_FT
Follow-up with Dr. Duke within 7-10 days. Dressing change daily until dry, then leave dressing in place until office follow-up. Pain medications as indicated and titrated to patient's needs. Patient will begin aspirin 81mg daily for 4 weeks post-operatively for clot prevention.   Ambulate with assistance of walker. Contact office if the wound becomes red, opens or discharge increases.  Follow-up with Dr. Duke within 7-10 days. Dressing change daily until dry, then leave dressing in place until office follow-up. Pain medications as indicated and titrated to patient's needs. Patient will begin aspirin 81mg daily for 4 weeks post-operatively for clot prevention.   Ambulate with assistance of walker. Contact office if the wound becomes red, opens or discharge increases. Continue as needed dressing changes for blister, follow up PCP.

## 2021-07-26 NOTE — BRIEF OPERATIVE NOTE - CONDITION POST OP
Routed to the ob scheduling pool. This pt will need a 30 min appt.     
Spoke with pt got appt set up for next available.  
stable

## 2021-07-26 NOTE — DISCHARGE NOTE PROVIDER - NSDCCPTREATMENT_GEN_ALL_CORE_FT
PRINCIPAL PROCEDURE  Procedure: Laminectomy and microdiscectomy, lumbar  Findings and Treatment:

## 2021-07-26 NOTE — CONSULT NOTE ADULT - SUBJECTIVE AND OBJECTIVE BOX
HPI:  60 year old female pmhx of asthma last exacerbation winter of 2020 was hospitalized for 14 days, uses rescue inhaler 1x per month on average, tracheomalacia, glottis stenosis, sarcoidosis, pulmonary stenosis, tachycardia, osteopenia, hand tremors, migraines, 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 Presents to [facility] with complaint of low back pain and left hip pain for the last 9-10 months, describes pain as so severe it takes her breath away, 10+/10 in severity, progressively worsened over the last few months, exacerbated by movement, relieved with otc Solo patch, ice packs, and tylenol. She is having intermittent loss of bladder sensation and has urinated herself several times. Xray of the lumbar spine taken 06/24/2021 demonstrates multiple levels of lumbar degenerative disc disease mainly at L4-L5 and L5-S1, there is loss of lordosis. MRI obtained from [facility] Demonstrates age-appropriate lumbar spondylosis with moderate to severe stenosis at L4-L5 that is left worse than right, L5-S1 stenosis moderate to severe. Patient post op L4 laminectomy, partial L5-L3 with Dr uDke.       Allergies/Medications:   Allergies:        Allergies:  	sulfa drugs: Drug Category, Rash  	azithromycin: Drug, Diarrhea  	contrast media (iodine-based): Drug Category, Unknown  	contrast media (gadolinium-based): Drug Category, Unknown    Home Medications:   * Patient Currently Takes Medications as of 15-Jul-2021 12:11 documented in Structured Notes  · 	Fasenra Pen 30 mg/mL subcutaneous solution: Last Dose Taken:  , subcutaneous every 2 months  · 	Aimovig SureClick Autoinjector 140 mg/mL subcutaneous solution: Last Dose Taken:  , subcutaneous once a month  · 	levalbuterol: Last Dose Taken:    · 	valACYclovir 500 mg oral tablet: Last Dose Taken:  , 1 tab(s) orally once a day  · 	Breo Ellipta 200 mcg-25 mcg/inh inhalation powder: Last Dose Taken:  , 1 puff(s) inhaled once a day  · 	Incruse Ellipta 62.5 mcg/inh inhalation powder: Last Dose Taken:  , 1 puff(s) inhaled every 24 hours  · 	fluticasone 50 mcg/inh inhalation powder: Last Dose Taken:  , 1 puff(s) inhaled 2 times a day  · 	azelastine nasal: Last Dose Taken:    · 	dilTIAZem 120 mg oral tablet: Last Dose Taken:  , orally once a day  · 	primidone 50 mg oral tablet: Last Dose Taken:  , orally once a day  · 	montelukast 10 mg oral tablet: Last Dose Taken:  , 1 tab(s) orally once a day  · 	famotidine 40 mg oral tablet: Last Dose Taken:  , 1 tab(s) orally once a day (at bedtime)  · 	pantoprazole 40 mg oral delayed release tablet: 1 tab(s) orally 2 times a day  · 	Zinc:   · 	Marlene-C: Last Dose Taken:    · 	Acidophilus oral tablet: Last Dose Taken:      PAST MEDICAL & SURGICAL HISTORY:  Lumbar stenosis with neurogenic claudication  COVID-19 vaccine series completed  Tracheomalacia  Glottic stenosis  Asthma  Osteopenia  Pulmonary stenosis  Sarcoidosis  H/O splenectomy  History of tonsillectomy  H/O foot surgery  H/O ovarian cystectomy      ALBUTerol    0.083% 2.5 milliGRAM(s) Nebulizer every 6 hours  ALBUTerol    90 MICROgram(s) HFA Inhaler 1 Puff(s) Inhalation every 4 hours  ALPRAZolam 0.5 milliGRAM(s) Oral every 8 hours PRN  budesonide 160 MICROgram(s)/formoterol 4.5 MICROgram(s) Inhaler 2 Puff(s) Inhalation two times a day  diltiazem    milliGRAM(s) Oral daily  fluticasone propionate 50 MICROgram(s)/spray Nasal Spray 1 Spray(s) Both Nostrils two times a day  montelukast 10 milliGRAM(s) Oral daily  primidone 50 milliGRAM(s) Oral daily  valACYclovir 500 milliGRAM(s) Oral daily    MEDICATIONS  (STANDING):  ALBUTerol    0.083% 2.5 milliGRAM(s) Nebulizer every 6 hours  ALBUTerol    90 MICROgram(s) HFA Inhaler 1 Puff(s) Inhalation every 4 hours  budesonide 160 MICROgram(s)/formoterol 4.5 MICROgram(s) Inhaler 2 Puff(s) Inhalation two times a day  diltiazem    milliGRAM(s) Oral daily  fluticasone propionate 50 MICROgram(s)/spray Nasal Spray 1 Spray(s) Both Nostrils two times a day  montelukast 10 milliGRAM(s) Oral daily  primidone 50 milliGRAM(s) Oral daily  valACYclovir 500 milliGRAM(s) Oral daily    MEDICATIONS  (PRN):  ALPRAZolam 0.5 milliGRAM(s) Oral every 8 hours PRN anxiety    )      SOCIAL HISTORY:  NO S/D/ IVDU    FAMILY HISTORY:  FH: breast cancer (Mother)    FH: leukemia (Mother)    FH: multiple myeloma (Father)    FH: heart disease (Father)        LABS:    07-26    142  |  104  |  16.6  ----------------------------<  103<H>  4.2   |  27.0  |  0.70    Ca    9.6      26 Jul 2021 10:45          ROS  - Headache  - Neck Stiffness  - Chest Pain  - SOB  - Abd pain  - Pelvic Pain  - Leg Pain  + Mild Back pain       Vital Signs Last 24 Hrs  T(C): 36.6 (26 Jul 2021 17:35), Max: 36.7 (26 Jul 2021 10:41)  T(F): 97.9 (26 Jul 2021 17:35), Max: 98 (26 Jul 2021 10:41)  HR: 72 (26 Jul 2021 19:05) (70 - 92)  BP: 115/69 (26 Jul 2021 19:05) (110/60 - 143/76)  BP(mean): --  RR: 20 (26 Jul 2021 19:05) (13 - 20)  SpO2: 98% (26 Jul 2021 19:05) (95% - 100%)  HEENT: PEARLA  Neck: Supple  Cardio: S1 S2 No Murmur  Pulm: CTA No Rales or Ronchi Fair air entry   Abd: Soft NT ND BS+  Back - Bandage clean   Rectal Breast Pelvic - refused  Ext: No DCT  Skin: No Rash  Neuro: Awake Pleasant    Post op surgery for Lumbar spondylosis with moderate to severe stenosis at L4-L5  - post op pain control   Tracheomalacia - will monitor   HTN - diltiazem    milliGRAM(s) Oral daily  Asthma / Sarcoidosis - Albuterol Breo Ellipta  Anxiety - valium   Hyperglycemia - stress induced

## 2021-07-27 LAB
ANION GAP SERPL CALC-SCNC: 11 MMOL/L — SIGNIFICANT CHANGE UP (ref 5–17)
BASOPHILS # BLD AUTO: 0.01 K/UL — SIGNIFICANT CHANGE UP (ref 0–0.2)
BASOPHILS NFR BLD AUTO: 0.1 % — SIGNIFICANT CHANGE UP (ref 0–2)
BUN SERPL-MCNC: 12.1 MG/DL — SIGNIFICANT CHANGE UP (ref 8–20)
CALCIUM SERPL-MCNC: 8.4 MG/DL — LOW (ref 8.6–10.2)
CHLORIDE SERPL-SCNC: 105 MMOL/L — SIGNIFICANT CHANGE UP (ref 98–107)
CO2 SERPL-SCNC: 22 MMOL/L — SIGNIFICANT CHANGE UP (ref 22–29)
CREAT SERPL-MCNC: 0.62 MG/DL — SIGNIFICANT CHANGE UP (ref 0.5–1.3)
EOSINOPHIL # BLD AUTO: 0 K/UL — SIGNIFICANT CHANGE UP (ref 0–0.5)
EOSINOPHIL NFR BLD AUTO: 0 % — SIGNIFICANT CHANGE UP (ref 0–6)
GLUCOSE SERPL-MCNC: 144 MG/DL — HIGH (ref 70–99)
HCT VFR BLD CALC: 36.2 % — SIGNIFICANT CHANGE UP (ref 34.5–45)
HGB BLD-MCNC: 11.9 G/DL — SIGNIFICANT CHANGE UP (ref 11.5–15.5)
IMM GRANULOCYTES NFR BLD AUTO: 1.2 % — SIGNIFICANT CHANGE UP (ref 0–1.5)
LYMPHOCYTES # BLD AUTO: 0.82 K/UL — LOW (ref 1–3.3)
LYMPHOCYTES # BLD AUTO: 5.8 % — LOW (ref 13–44)
MCHC RBC-ENTMCNC: 31.5 PG — SIGNIFICANT CHANGE UP (ref 27–34)
MCHC RBC-ENTMCNC: 32.9 GM/DL — SIGNIFICANT CHANGE UP (ref 32–36)
MCV RBC AUTO: 95.8 FL — SIGNIFICANT CHANGE UP (ref 80–100)
MONOCYTES # BLD AUTO: 0.3 K/UL — SIGNIFICANT CHANGE UP (ref 0–0.9)
MONOCYTES NFR BLD AUTO: 2.1 % — SIGNIFICANT CHANGE UP (ref 2–14)
NEUTROPHILS # BLD AUTO: 12.86 K/UL — HIGH (ref 1.8–7.4)
NEUTROPHILS NFR BLD AUTO: 90.8 % — HIGH (ref 43–77)
PLATELET # BLD AUTO: 248 K/UL — SIGNIFICANT CHANGE UP (ref 150–400)
POTASSIUM SERPL-MCNC: 4 MMOL/L — SIGNIFICANT CHANGE UP (ref 3.5–5.3)
POTASSIUM SERPL-SCNC: 4 MMOL/L — SIGNIFICANT CHANGE UP (ref 3.5–5.3)
RBC # BLD: 3.78 M/UL — LOW (ref 3.8–5.2)
RBC # FLD: 14.6 % — HIGH (ref 10.3–14.5)
SODIUM SERPL-SCNC: 138 MMOL/L — SIGNIFICANT CHANGE UP (ref 135–145)
WBC # BLD: 14.16 K/UL — HIGH (ref 3.8–10.5)
WBC # FLD AUTO: 14.16 K/UL — HIGH (ref 3.8–10.5)

## 2021-07-27 RX ORDER — OXYCODONE HYDROCHLORIDE 5 MG/1
10 TABLET ORAL
Refills: 0 | Status: DISCONTINUED | OUTPATIENT
Start: 2021-07-27 | End: 2021-07-28

## 2021-07-27 RX ORDER — TIOTROPIUM BROMIDE 18 UG/1
1 CAPSULE ORAL; RESPIRATORY (INHALATION) DAILY
Refills: 0 | Status: DISCONTINUED | OUTPATIENT
Start: 2021-07-27 | End: 2021-07-27

## 2021-07-27 RX ORDER — FLUTICASONE FUROATE AND VILANTEROL TRIFENATATE 100; 25 UG/1; UG/1
1 POWDER RESPIRATORY (INHALATION) DAILY
Refills: 0 | Status: DISCONTINUED | OUTPATIENT
Start: 2021-07-27 | End: 2021-07-30

## 2021-07-27 RX ORDER — IPRATROPIUM/ALBUTEROL SULFATE 18-103MCG
3 AEROSOL WITH ADAPTER (GRAM) INHALATION EVERY 6 HOURS
Refills: 0 | Status: DISCONTINUED | OUTPATIENT
Start: 2021-07-27 | End: 2021-07-27

## 2021-07-27 RX ORDER — OXYCODONE HYDROCHLORIDE 5 MG/1
5 TABLET ORAL
Refills: 0 | Status: DISCONTINUED | OUTPATIENT
Start: 2021-07-27 | End: 2021-07-28

## 2021-07-27 RX ORDER — IPRATROPIUM/ALBUTEROL SULFATE 18-103MCG
3 AEROSOL WITH ADAPTER (GRAM) INHALATION EVERY 6 HOURS
Refills: 0 | Status: DISCONTINUED | OUTPATIENT
Start: 2021-07-27 | End: 2021-07-30

## 2021-07-27 RX ORDER — ALBUTEROL 90 UG/1
1 AEROSOL, METERED ORAL EVERY 4 HOURS
Refills: 0 | Status: DISCONTINUED | OUTPATIENT
Start: 2021-07-27 | End: 2021-07-27

## 2021-07-27 RX ORDER — UMECLIDINIUM 62.5 UG/1
1 AEROSOL, POWDER ORAL DAILY
Refills: 0 | Status: DISCONTINUED | OUTPATIENT
Start: 2021-07-27 | End: 2021-07-30

## 2021-07-27 RX ADMIN — Medication 3 MILLILITER(S): at 08:15

## 2021-07-27 RX ADMIN — SENNA PLUS 2 TABLET(S): 8.6 TABLET ORAL at 23:07

## 2021-07-27 RX ADMIN — VALACYCLOVIR 500 MILLIGRAM(S): 500 TABLET, FILM COATED ORAL at 12:35

## 2021-07-27 RX ADMIN — Medication 1 SPRAY(S): at 17:48

## 2021-07-27 RX ADMIN — PRIMIDONE 50 MILLIGRAM(S): 250 TABLET ORAL at 17:48

## 2021-07-27 RX ADMIN — Medication 975 MILLIGRAM(S): at 12:35

## 2021-07-27 RX ADMIN — Medication 975 MILLIGRAM(S): at 17:48

## 2021-07-27 RX ADMIN — Medication 975 MILLIGRAM(S): at 00:37

## 2021-07-27 RX ADMIN — UMECLIDINIUM 1 PUFF(S): 62.5 AEROSOL, POWDER ORAL at 17:49

## 2021-07-27 RX ADMIN — METHOCARBAMOL 750 MILLIGRAM(S): 500 TABLET, FILM COATED ORAL at 23:07

## 2021-07-27 RX ADMIN — PANTOPRAZOLE SODIUM 40 MILLIGRAM(S): 20 TABLET, DELAYED RELEASE ORAL at 05:30

## 2021-07-27 RX ADMIN — Medication 975 MILLIGRAM(S): at 05:30

## 2021-07-27 RX ADMIN — Medication 975 MILLIGRAM(S): at 05:57

## 2021-07-27 RX ADMIN — METHOCARBAMOL 750 MILLIGRAM(S): 500 TABLET, FILM COATED ORAL at 05:30

## 2021-07-27 RX ADMIN — MONTELUKAST 10 MILLIGRAM(S): 4 TABLET, CHEWABLE ORAL at 17:48

## 2021-07-27 RX ADMIN — Medication 975 MILLIGRAM(S): at 23:07

## 2021-07-27 RX ADMIN — Medication 0.5 MILLIGRAM(S): at 23:09

## 2021-07-27 RX ADMIN — METHOCARBAMOL 750 MILLIGRAM(S): 500 TABLET, FILM COATED ORAL at 13:46

## 2021-07-27 RX ADMIN — Medication 0.5 MILLIGRAM(S): at 09:57

## 2021-07-27 RX ADMIN — ALBUTEROL 2.5 MILLIGRAM(S): 90 AEROSOL, METERED ORAL at 03:17

## 2021-07-27 RX ADMIN — Medication 975 MILLIGRAM(S): at 13:05

## 2021-07-27 RX ADMIN — Medication 100 MILLIGRAM(S): at 05:31

## 2021-07-27 RX ADMIN — FLUTICASONE FUROATE AND VILANTEROL TRIFENATATE 1 PUFF(S): 100; 25 POWDER RESPIRATORY (INHALATION) at 17:50

## 2021-07-27 RX ADMIN — Medication 975 MILLIGRAM(S): at 19:18

## 2021-07-27 RX ADMIN — Medication 1 SPRAY(S): at 05:30

## 2021-07-27 RX ADMIN — Medication 81 MILLIGRAM(S): at 12:35

## 2021-07-27 NOTE — PROGRESS NOTE ADULT - SUBJECTIVE AND OBJECTIVE BOX
Patient was seen and examined at approximately 8a        [facility]    [id number]    History:  The patient is status post posterior lumbar laminectomy on 7/26/2021, POD # 1. Patient is doing well. The patient's pain is controlled using the prescribed pain medications. The patient is pending physical therapy evaluation. Denies nausea, vomiting, chest pain, shortness of breath, abdominal pain or fever. No new complaints. No acute motor or sensory changes are reported.     Vital Signs Last 24 Hrs  T(C): 36.6 (27 Jul 2021 04:31), Max: 36.7 (26 Jul 2021 10:41)  T(F): 97.8 (27 Jul 2021 04:31), Max: 98 (26 Jul 2021 10:41)  HR: 62 (27 Jul 2021 08:26) (58 - 92)  BP: 99/55 (27 Jul 2021 04:31) (99/55 - 143/76)  BP(mean): --  RR: 18 (27 Jul 2021 04:31) (13 - 20)  SpO2: 98% (27 Jul 2021 08:26) (95% - 100%)    I&O's Detail    26 Jul 2021 07:01  -  27 Jul 2021 07:00  --------------------------------------------------------  IN:    Lactated Ringers: 225 mL    Oral Fluid: 125 mL  Total IN: 350 mL    OUT:    Voided (mL): 1350 mL  Total OUT: 1350 mL    Total NET: -1000 mL        I&O's Summary    26 Jul 2021 07:01  -  27 Jul 2021 07:00  --------------------------------------------------------  IN: 350 mL / OUT: 1350 mL / NET: -1000 mL                              11.9   14.16 )-----------( 248      ( 27 Jul 2021 06:10 )             36.2     07-27    138  |  105  |  12.1  ----------------------------<  144<H>  4.0   |  22.0  |  0.62    Ca    8.4<L>      27 Jul 2021 06:10        MEDICATIONS  (STANDING):  acetaminophen   Tablet .. 975 milliGRAM(s) Oral every 6 hours  ALBUTerol    0.083% 2.5 milliGRAM(s) Nebulizer every 6 hours  ALBUTerol    90 MICROgram(s) HFA Inhaler 1 Puff(s) Inhalation every 4 hours  ALBUTerol    90 MICROgram(s) HFA Inhaler 1 Puff(s) Inhalation every 4 hours  albuterol/ipratropium for Nebulization 3 milliLiter(s) Nebulizer every 6 hours  aspirin enteric coated 81 milliGRAM(s) Oral daily  budesonide 160 MICROgram(s)/formoterol 4.5 MICROgram(s) Inhaler 2 Puff(s) Inhalation two times a day  dextrose 5% + sodium chloride 0.45%. 1000 milliLiter(s) (80 mL/Hr) IV Continuous <Continuous>  diltiazem    milliGRAM(s) Oral daily  fluticasone propionate 50 MICROgram(s)/spray Nasal Spray 1 Spray(s) Both Nostrils two times a day  methocarbamol 750 milliGRAM(s) Oral three times a day  montelukast 10 milliGRAM(s) Oral daily  pantoprazole    Tablet 40 milliGRAM(s) Oral before breakfast  primidone 50 milliGRAM(s) Oral daily  senna 2 Tablet(s) Oral at bedtime  tiotropium 18 MICROgram(s) Capsule 1 Capsule(s) Inhalation daily  valACYclovir 500 milliGRAM(s) Oral daily    MEDICATIONS  (PRN):  ALPRAZolam 0.5 milliGRAM(s) Oral every 8 hours PRN anxiety  aluminum hydroxide/magnesium hydroxide/simethicone Suspension 30 milliLiter(s) Oral every 12 hours PRN Indigestion  magnesium hydroxide Suspension 30 milliLiter(s) Oral every 12 hours PRN Constipation  ondansetron Injectable 4 milliGRAM(s) IV Push every 6 hours PRN Nausea      Physical exam: The dressing is clean, dry and intact. No drain is noted. No wound erythema or discharge is noted. Sensation to light touch is grossly intact with minimal right toe tingling sensation. Motor function is 4+/5 without focal deficit.  No calf tenderness. 2+ dorsalis pedis pulse. Capillary refill is less than 2 seconds. No cyanosis.    Primary Orthopedic Assessment:  • s/p posterior lumbar laminectomy     Secondary  Orthopedic Assessment(s):   •     Secondary  Medical Assessment(s):   Tracheomalacia  Glottic stenosis  Asthma  Osteopenia  Pulmonary stenosis  Sarcoidosis  H/O splenectomy  History of tonsillectomy  H/O foot surgery  H/O ovarian cystectomy        Plan:   • DVT prophylaxis as prescribed, including use of compression devices and ankle pumps  • Continue physical therapy  • Weightbearing as tolerated of the lower extremities with assistance of a walker or cane as needed  • Incentive spirometry encouraged  • Pain control as clinically indicated  • Discharge planning – anticipated discharge is Home tomorrow

## 2021-07-27 NOTE — PHYSICAL THERAPY INITIAL EVALUATION ADULT - PERTINENT HX OF CURRENT PROBLEM, REHAB EVAL
Spinal stenosis of lumbar region with neurogenic claudication now s/p L4 laminectomy and L5-S3 partial laminectomy with micro discectomy.

## 2021-07-27 NOTE — PHYSICAL THERAPY INITIAL EVALUATION ADULT - CRITERIA FOR SKILLED THERAPEUTIC INTERVENTIONS
Home with RW, Home PT/impairments found/anticipated equipment needs at discharge/anticipated discharge recommendation

## 2021-07-27 NOTE — PHYSICAL THERAPY INITIAL EVALUATION ADULT - ADDITIONAL COMMENTS
Pt lives in a private home with her fiance. No steps to navigate. Pt was independent PTA without an assist device. Pt owns no DME.

## 2021-07-27 NOTE — PROGRESS NOTE ADULT - SUBJECTIVE AND OBJECTIVE BOX
HPI:     Allergies    azithromycin (Diarrhea)  contrast media (gadolinium-based) (Unknown)  contrast media (iodine-based) (Unknown)  sulfa drugs (Rash)    Intolerances      Lumbar stenosis with neurogenic claudication    COVID-19 vaccine series completed    Tracheomalacia    Glottic stenosis    Asthma    Osteopenia    Pulmonary stenosis    Sarcoidosis      MEDICATIONS  (STANDING):  acetaminophen   Tablet .. 975 milliGRAM(s) Oral every 6 hours  ALBUTerol    0.083% 2.5 milliGRAM(s) Nebulizer every 6 hours  ALBUTerol    90 MICROgram(s) HFA Inhaler 1 Puff(s) Inhalation every 4 hours  ALBUTerol    90 MICROgram(s) HFA Inhaler 1 Puff(s) Inhalation every 4 hours  aspirin enteric coated 81 milliGRAM(s) Oral daily  dextrose 5% + sodium chloride 0.45%. 1000 milliLiter(s) (80 mL/Hr) IV Continuous <Continuous>  diltiazem    milliGRAM(s) Oral daily  fluticasone furoate/vilanterol 200-25 MICROgram(s) Inhaler 1 Puff(s) Inhalation daily  fluticasone propionate 50 MICROgram(s)/spray Nasal Spray 1 Spray(s) Both Nostrils two times a day  methocarbamol 750 milliGRAM(s) Oral three times a day  montelukast 10 milliGRAM(s) Oral daily  pantoprazole    Tablet 40 milliGRAM(s) Oral before breakfast  primidone 50 milliGRAM(s) Oral daily  senna 2 Tablet(s) Oral at bedtime  umeclidinium 62.5 MICROgram(s) Inhaler 1 Puff(s) Inhalation daily  valACYclovir 500 milliGRAM(s) Oral daily    MEDICATIONS  (PRN):  ALPRAZolam 0.5 milliGRAM(s) Oral every 8 hours PRN anxiety  aluminum hydroxide/magnesium hydroxide/simethicone Suspension 30 milliLiter(s) Oral every 12 hours PRN Indigestion  magnesium hydroxide Suspension 30 milliLiter(s) Oral every 12 hours PRN Constipation  ondansetron Injectable 4 milliGRAM(s) IV Push every 6 hours PRN Nausea                           11.9   14.16 )-----------( 248      ( 27 Jul 2021 06:10 )             36.2     07-27    138  |  105  |  12.1  ----------------------------<  144<H>  4.0   |  22.0  |  0.62    Ca    8.4<L>      27 Jul 2021 06:10        ;  Vital Signs Last 24 Hrs  T(C): 36.7 (27 Jul 2021 11:30), Max: 36.7 (27 Jul 2021 11:30)  T(F): 98.1 (27 Jul 2021 11:30), Max: 98.1 (27 Jul 2021 11:30)  HR: 58 (27 Jul 2021 11:30) (58 - 92)  BP: 112/68 (27 Jul 2021 11:30) (99/55 - 138/75)  BP(mean): --  RR: 18 (27 Jul 2021 11:30) (13 - 20)  SpO2: 95% (27 Jul 2021 11:30) (95% - 100%)  CAPILLARY BLOOD GLUCOSE      07-26 @ 07:01  -  07-27 @ 07:00  --------------------------------------------------------  IN: 350 mL / OUT: 1350 mL / NET: -1000 mL      Patient denies CP, No SOB, No N/V mod pain   HEENT: PEARLA  Neck: Supple  Cardio: S1 S2 No Murmur  Pulm: CTA No Rales or Ronchi Fair air entry   Abd: Soft NT ND BS+  Back - Bandage clean   Rectal Breast Pelvic - refused  Ext: No DCT  Skin: No Rash  Neuro: Awake Pleasant    Post op surgery for Lumbar spondylosis with moderate to severe stenosis at L4-L5  - post op pain control   Tracheomalacia - will monitor   Tachycardia - diltiazem    milliGRAM(s) Oral daily  Asthma / Sarcoidosis - Albuterol Breo Ellipta and anticholinergic   Anxiety - valium   Hyperglycemia - stress induced diet only

## 2021-07-28 LAB
ANION GAP SERPL CALC-SCNC: 9 MMOL/L — SIGNIFICANT CHANGE UP (ref 5–17)
BASOPHILS # BLD AUTO: 0.02 K/UL — SIGNIFICANT CHANGE UP (ref 0–0.2)
BASOPHILS NFR BLD AUTO: 0.2 % — SIGNIFICANT CHANGE UP (ref 0–2)
BUN SERPL-MCNC: 19.4 MG/DL — SIGNIFICANT CHANGE UP (ref 8–20)
CALCIUM SERPL-MCNC: 8.3 MG/DL — LOW (ref 8.6–10.2)
CHLORIDE SERPL-SCNC: 107 MMOL/L — SIGNIFICANT CHANGE UP (ref 98–107)
CO2 SERPL-SCNC: 24 MMOL/L — SIGNIFICANT CHANGE UP (ref 22–29)
CREAT SERPL-MCNC: 0.63 MG/DL — SIGNIFICANT CHANGE UP (ref 0.5–1.3)
EOSINOPHIL # BLD AUTO: 0 K/UL — SIGNIFICANT CHANGE UP (ref 0–0.5)
EOSINOPHIL NFR BLD AUTO: 0 % — SIGNIFICANT CHANGE UP (ref 0–6)
GLUCOSE SERPL-MCNC: 99 MG/DL — SIGNIFICANT CHANGE UP (ref 70–99)
HCT VFR BLD CALC: 33.5 % — LOW (ref 34.5–45)
HGB BLD-MCNC: 11.2 G/DL — LOW (ref 11.5–15.5)
IMM GRANULOCYTES NFR BLD AUTO: 0.4 % — SIGNIFICANT CHANGE UP (ref 0–1.5)
LYMPHOCYTES # BLD AUTO: 2.37 K/UL — SIGNIFICANT CHANGE UP (ref 1–3.3)
LYMPHOCYTES # BLD AUTO: 22.8 % — SIGNIFICANT CHANGE UP (ref 13–44)
MCHC RBC-ENTMCNC: 32 PG — SIGNIFICANT CHANGE UP (ref 27–34)
MCHC RBC-ENTMCNC: 33.4 GM/DL — SIGNIFICANT CHANGE UP (ref 32–36)
MCV RBC AUTO: 95.7 FL — SIGNIFICANT CHANGE UP (ref 80–100)
MONOCYTES # BLD AUTO: 0.73 K/UL — SIGNIFICANT CHANGE UP (ref 0–0.9)
MONOCYTES NFR BLD AUTO: 7 % — SIGNIFICANT CHANGE UP (ref 2–14)
NEUTROPHILS # BLD AUTO: 7.23 K/UL — SIGNIFICANT CHANGE UP (ref 1.8–7.4)
NEUTROPHILS NFR BLD AUTO: 69.6 % — SIGNIFICANT CHANGE UP (ref 43–77)
PLATELET # BLD AUTO: 239 K/UL — SIGNIFICANT CHANGE UP (ref 150–400)
POTASSIUM SERPL-MCNC: 4 MMOL/L — SIGNIFICANT CHANGE UP (ref 3.5–5.3)
POTASSIUM SERPL-SCNC: 4 MMOL/L — SIGNIFICANT CHANGE UP (ref 3.5–5.3)
RBC # BLD: 3.5 M/UL — LOW (ref 3.8–5.2)
RBC # FLD: 14.8 % — HIGH (ref 10.3–14.5)
SODIUM SERPL-SCNC: 140 MMOL/L — SIGNIFICANT CHANGE UP (ref 135–145)
WBC # BLD: 10.39 K/UL — SIGNIFICANT CHANGE UP (ref 3.8–10.5)
WBC # FLD AUTO: 10.39 K/UL — SIGNIFICANT CHANGE UP (ref 3.8–10.5)

## 2021-07-28 RX ORDER — FLUTICASONE PROPIONATE AND SALMETEROL 232; 14 UG/1; UG/1
232-14 POWDER, METERED RESPIRATORY (INHALATION)
Qty: 1 | Refills: 3 | Status: DISCONTINUED | COMMUNITY
Start: 2020-02-12 | End: 2021-07-28

## 2021-07-28 RX ORDER — TRAMADOL HYDROCHLORIDE 50 MG/1
50 TABLET ORAL EVERY 4 HOURS
Refills: 0 | Status: DISCONTINUED | OUTPATIENT
Start: 2021-07-28 | End: 2021-07-30

## 2021-07-28 RX ORDER — FLUTICASONE FUROATE AND VILANTEROL TRIFENATATE 200; 25 UG/1; UG/1
200-25 POWDER RESPIRATORY (INHALATION) DAILY
Qty: 1 | Refills: 2 | Status: DISCONTINUED | COMMUNITY
Start: 2020-02-11 | End: 2021-07-28

## 2021-07-28 RX ORDER — FLUTICASONE PROPIONATE AND SALMETEROL 250; 50 UG/1; UG/1
250-50 POWDER RESPIRATORY (INHALATION)
Qty: 1 | Refills: 11 | Status: DISCONTINUED | COMMUNITY
Start: 2021-06-18 | End: 2021-07-28

## 2021-07-28 RX ORDER — TRAMADOL HYDROCHLORIDE 50 MG/1
100 TABLET ORAL EVERY 6 HOURS
Refills: 0 | Status: DISCONTINUED | OUTPATIENT
Start: 2021-07-28 | End: 2021-07-30

## 2021-07-28 RX ADMIN — Medication 975 MILLIGRAM(S): at 12:09

## 2021-07-28 RX ADMIN — Medication 975 MILLIGRAM(S): at 22:15

## 2021-07-28 RX ADMIN — UMECLIDINIUM 1 PUFF(S): 62.5 AEROSOL, POWDER ORAL at 08:44

## 2021-07-28 RX ADMIN — MONTELUKAST 10 MILLIGRAM(S): 4 TABLET, CHEWABLE ORAL at 17:52

## 2021-07-28 RX ADMIN — Medication 1 SPRAY(S): at 05:19

## 2021-07-28 RX ADMIN — SENNA PLUS 2 TABLET(S): 8.6 TABLET ORAL at 22:15

## 2021-07-28 RX ADMIN — TRAMADOL HYDROCHLORIDE 100 MILLIGRAM(S): 50 TABLET ORAL at 21:09

## 2021-07-28 RX ADMIN — Medication 975 MILLIGRAM(S): at 06:38

## 2021-07-28 RX ADMIN — Medication 0.5 MILLIGRAM(S): at 12:06

## 2021-07-28 RX ADMIN — Medication 975 MILLIGRAM(S): at 17:51

## 2021-07-28 RX ADMIN — METHOCARBAMOL 750 MILLIGRAM(S): 500 TABLET, FILM COATED ORAL at 05:18

## 2021-07-28 RX ADMIN — FLUTICASONE FUROATE AND VILANTEROL TRIFENATATE 1 PUFF(S): 100; 25 POWDER RESPIRATORY (INHALATION) at 08:44

## 2021-07-28 RX ADMIN — Medication 975 MILLIGRAM(S): at 17:53

## 2021-07-28 RX ADMIN — TRAMADOL HYDROCHLORIDE 50 MILLIGRAM(S): 50 TABLET ORAL at 08:43

## 2021-07-28 RX ADMIN — Medication 975 MILLIGRAM(S): at 05:18

## 2021-07-28 RX ADMIN — TRAMADOL HYDROCHLORIDE 100 MILLIGRAM(S): 50 TABLET ORAL at 22:21

## 2021-07-28 RX ADMIN — METHOCARBAMOL 750 MILLIGRAM(S): 500 TABLET, FILM COATED ORAL at 22:15

## 2021-07-28 RX ADMIN — Medication 0.5 MILLIGRAM(S): at 22:15

## 2021-07-28 RX ADMIN — VALACYCLOVIR 500 MILLIGRAM(S): 500 TABLET, FILM COATED ORAL at 12:08

## 2021-07-28 RX ADMIN — Medication 975 MILLIGRAM(S): at 22:21

## 2021-07-28 RX ADMIN — Medication 81 MILLIGRAM(S): at 12:08

## 2021-07-28 RX ADMIN — Medication 1 SPRAY(S): at 17:52

## 2021-07-28 RX ADMIN — PANTOPRAZOLE SODIUM 40 MILLIGRAM(S): 20 TABLET, DELAYED RELEASE ORAL at 05:18

## 2021-07-28 RX ADMIN — TRAMADOL HYDROCHLORIDE 50 MILLIGRAM(S): 50 TABLET ORAL at 10:20

## 2021-07-28 RX ADMIN — Medication 975 MILLIGRAM(S): at 00:50

## 2021-07-28 RX ADMIN — PRIMIDONE 50 MILLIGRAM(S): 250 TABLET ORAL at 17:52

## 2021-07-28 RX ADMIN — Medication 975 MILLIGRAM(S): at 12:07

## 2021-07-28 NOTE — PROGRESS NOTE ADULT - SUBJECTIVE AND OBJECTIVE BOX
HPI:     Allergies    azithromycin (Diarrhea)  contrast media (gadolinium-based) (Unknown)  contrast media (iodine-based) (Unknown)  sulfa drugs (Rash)    Intolerances      Lumbar stenosis with neurogenic claudication    COVID-19 vaccine series completed    Tracheomalacia    Glottic stenosis    Asthma    Osteopenia    Pulmonary stenosis    Sarcoidosis      MEDICATIONS  (STANDING):  acetaminophen   Tablet .. 975 milliGRAM(s) Oral every 6 hours  aspirin enteric coated 81 milliGRAM(s) Oral daily  dextrose 5% + sodium chloride 0.45%. 1000 milliLiter(s) (80 mL/Hr) IV Continuous <Continuous>  fluticasone furoate/vilanterol 200-25 MICROgram(s) Inhaler 1 Puff(s) Inhalation daily  fluticasone propionate 50 MICROgram(s)/spray Nasal Spray 1 Spray(s) Both Nostrils two times a day  methocarbamol 750 milliGRAM(s) Oral three times a day  montelukast 10 milliGRAM(s) Oral daily  pantoprazole    Tablet 40 milliGRAM(s) Oral before breakfast  primidone 50 milliGRAM(s) Oral daily  senna 2 Tablet(s) Oral at bedtime  umeclidinium 62.5 MICROgram(s) Inhaler 1 Puff(s) Inhalation daily  valACYclovir 500 milliGRAM(s) Oral daily    MEDICATIONS  (PRN):  albuterol/ipratropium for Nebulization 3 milliLiter(s) Nebulizer every 6 hours PRN Shortness of Breath and/or Wheezing  ALPRAZolam 0.5 milliGRAM(s) Oral every 8 hours PRN anxiety  aluminum hydroxide/magnesium hydroxide/simethicone Suspension 30 milliLiter(s) Oral every 12 hours PRN Indigestion  magnesium hydroxide Suspension 30 milliLiter(s) Oral every 12 hours PRN Constipation  ondansetron Injectable 4 milliGRAM(s) IV Push every 6 hours PRN Nausea  traMADol 50 milliGRAM(s) Oral every 4 hours PRN Mild Pain (1 - 3)  traMADol 100 milliGRAM(s) Oral every 6 hours PRN Severe Pain (7 - 10)                           11.2   10.39 )-----------( 239      ( 28 Jul 2021 05:40 )             33.5     07-28    140  |  107  |  19.4  ----------------------------<  99  4.0   |  24.0  |  0.63    Ca    8.3<L>      28 Jul 2021 05:40        ;  Vital Signs Last 24 Hrs  T(C): 37.1 (28 Jul 2021 19:07), Max: 37.1 (28 Jul 2021 19:07)  T(F): 98.7 (28 Jul 2021 19:07), Max: 98.7 (28 Jul 2021 19:07)  HR: 84 (28 Jul 2021 17:00) (75 - 88)  BP: 97/55 (28 Jul 2021 17:00) (97/55 - 112/67)  BP(mean): --  RR: 18 (28 Jul 2021 17:00) (18 - 18)  SpO2: 99% (28 Jul 2021 17:00) (93% - 99%)  CAPILLARY BLOOD GLUCOSE      07-27 @ 07:01  -  07-28 @ 07:00  --------------------------------------------------------  IN: 0 mL / OUT: 1100 mL / NET: -1100 mL    07-28 @ 07:01 - 07-28 @ 20:02  --------------------------------------------------------  IN: 0 mL / OUT: 1500 mL / NET: -1500 mL      Patient denies CP, No SOB, No N/V mod pain   HEENT: PEARLA  Neck: Supple  Cardio: S1 S2 No Murmur  Pulm: CTA No Rales or Ronchi Fair air entry   Abd: Soft NT ND BS+  Back - Bandage clean   Rectal Breast Pelvic - refused  Ext: No DCT  Skin: No Rash  Neuro: Awake Pleasant    Post op surgery for Lumbar spondylosis with moderate to severe stenosis at L4-L5  - post op pain control   Hypotension - mild increase salt dc diltiazem   Tracheomalacia - will monitor   Tachycardia - will follow   Asthma / Sarcoidosis - Albuterol Breo Ellipta and anticholinergic   Anxiety - valium   Hyperglycemia - stress induced diet only

## 2021-07-28 NOTE — PROGRESS NOTE ADULT - SUBJECTIVE AND OBJECTIVE BOX
ORTHO-SPINE POST-OP PROGRESS NOTE:      [id number]    NELDA GARZA      History:  The patient is status post posterior lumbar laminectomy on 7/26/2021, POD # 2. Patient is doing well but continues to have significant pain with Tylenol only. She is avoiding narcotic pain medications due to past side effects. The patient is participated in  physical therapy evaluation and was able to make it to the doorway. Denies nausea, vomiting, chest pain, shortness of breath, abdominal pain or fever. No new complaints. No acute motor or sensory changes are reported.                  11.2   10.39 )-----------( 239      ( 28 Jul 2021 05:40 )             33.5               I&O's Detail    27 Jul 2021 07:01  -  28 Jul 2021 07:00  --------------------------------------------------------  IN:  Total IN: 0 mL    OUT:    Voided (mL): 1100 mL  Total OUT: 1100 mL    Total NET: -1100 mL            acetaminophen   Tablet .. 975 milliGRAM(s) Oral every 6 hours  albuterol/ipratropium for Nebulization 3 milliLiter(s) Nebulizer every 6 hours PRN  ALPRAZolam 0.5 milliGRAM(s) Oral every 8 hours PRN  aluminum hydroxide/magnesium hydroxide/simethicone Suspension 30 milliLiter(s) Oral every 12 hours PRN  aspirin enteric coated 81 milliGRAM(s) Oral daily  dextrose 5% + sodium chloride 0.45%. 1000 milliLiter(s) IV Continuous <Continuous>  diltiazem    milliGRAM(s) Oral daily  fluticasone furoate/vilanterol 200-25 MICROgram(s) Inhaler 1 Puff(s) Inhalation daily  fluticasone propionate 50 MICROgram(s)/spray Nasal Spray 1 Spray(s) Both Nostrils two times a day  magnesium hydroxide Suspension 30 milliLiter(s) Oral every 12 hours PRN  methocarbamol 750 milliGRAM(s) Oral three times a day  montelukast 10 milliGRAM(s) Oral daily  ondansetron Injectable 4 milliGRAM(s) IV Push every 6 hours PRN  oxyCODONE    IR 5 milliGRAM(s) Oral every 3 hours PRN  oxyCODONE    IR 10 milliGRAM(s) Oral every 3 hours PRN  pantoprazole    Tablet 40 milliGRAM(s) Oral before breakfast  primidone 50 milliGRAM(s) Oral daily  senna 2 Tablet(s) Oral at bedtime  umeclidinium 62.5 MICROgram(s) Inhaler 1 Puff(s) Inhalation daily  valACYclovir 500 milliGRAM(s) Oral daily        T(C): 36.4 (07-28-21 @ 06:02), Max: 36.9 (07-27-21 @ 21:25)  HR: 75 (07-28-21 @ 06:02) (58 - 88)  BP: 99/62 (07-28-21 @ 06:02) (99/62 - 112/68)  RR: 18 (07-28-21 @ 06:02) (18 - 18)  SpO2: 99% (07-28-21 @ 06:02) (93% - 100%)  Wt(kg): --      PHYSICAL EXAM:     Physical exam: The dressing is clean, dry and intact. No drain is noted. No wound erythema or discharge is noted. Sensation to light touch is grossly intact with minimal right toe tingling sensation. Motor function is 4+/5 without focal deficit.  No calf tenderness. 2+ dorsalis pedis pulse. Capillary refill is less than 2 seconds. No cyanosis.    Primary Orthopedic Assessment:  • s/p posterior lumbar laminectomy     Secondary  Orthopedic Assessment(s):   •     Secondary  Medical Assessment(s):   Tracheomalacia  Glottic stenosis  Asthma  Osteopenia  Pulmonary stenosis  Sarcoidosis  H/O splenectomy  History of tonsillectomy  H/O foot surgery  H/O ovarian cystectomy        Plan:   • DVT prophylaxis as prescribed, including use of compression devices and ankle pumps  • Continue physical therapy  • Weightbearing as tolerated of the lower extremities with assistance of a walker or cane as needed  • Incentive spirometry encouraged  • Pain control as clinically indicated - will trial Tramadol with continued use of Tylenol  • Discharge planning – anticipated discharge is Home Thursday

## 2021-07-29 LAB
ANION GAP SERPL CALC-SCNC: 10 MMOL/L — SIGNIFICANT CHANGE UP (ref 5–17)
BASOPHILS # BLD AUTO: 0.01 K/UL — SIGNIFICANT CHANGE UP (ref 0–0.2)
BASOPHILS NFR BLD AUTO: 0.1 % — SIGNIFICANT CHANGE UP (ref 0–2)
BUN SERPL-MCNC: 17.4 MG/DL — SIGNIFICANT CHANGE UP (ref 8–20)
CALCIUM SERPL-MCNC: 8.5 MG/DL — LOW (ref 8.6–10.2)
CHLORIDE SERPL-SCNC: 106 MMOL/L — SIGNIFICANT CHANGE UP (ref 98–107)
CO2 SERPL-SCNC: 25 MMOL/L — SIGNIFICANT CHANGE UP (ref 22–29)
CREAT SERPL-MCNC: 0.62 MG/DL — SIGNIFICANT CHANGE UP (ref 0.5–1.3)
EOSINOPHIL # BLD AUTO: 0 K/UL — SIGNIFICANT CHANGE UP (ref 0–0.5)
EOSINOPHIL NFR BLD AUTO: 0 % — SIGNIFICANT CHANGE UP (ref 0–6)
GLUCOSE SERPL-MCNC: 93 MG/DL — SIGNIFICANT CHANGE UP (ref 70–99)
HCT VFR BLD CALC: 33.3 % — LOW (ref 34.5–45)
HGB BLD-MCNC: 11 G/DL — LOW (ref 11.5–15.5)
IMM GRANULOCYTES NFR BLD AUTO: 0.1 % — SIGNIFICANT CHANGE UP (ref 0–1.5)
LYMPHOCYTES # BLD AUTO: 2.54 K/UL — SIGNIFICANT CHANGE UP (ref 1–3.3)
LYMPHOCYTES # BLD AUTO: 32.4 % — SIGNIFICANT CHANGE UP (ref 13–44)
MCHC RBC-ENTMCNC: 31.7 PG — SIGNIFICANT CHANGE UP (ref 27–34)
MCHC RBC-ENTMCNC: 33 GM/DL — SIGNIFICANT CHANGE UP (ref 32–36)
MCV RBC AUTO: 96 FL — SIGNIFICANT CHANGE UP (ref 80–100)
MONOCYTES # BLD AUTO: 0.68 K/UL — SIGNIFICANT CHANGE UP (ref 0–0.9)
MONOCYTES NFR BLD AUTO: 8.7 % — SIGNIFICANT CHANGE UP (ref 2–14)
NEUTROPHILS # BLD AUTO: 4.61 K/UL — SIGNIFICANT CHANGE UP (ref 1.8–7.4)
NEUTROPHILS NFR BLD AUTO: 58.7 % — SIGNIFICANT CHANGE UP (ref 43–77)
PLATELET # BLD AUTO: 234 K/UL — SIGNIFICANT CHANGE UP (ref 150–400)
POTASSIUM SERPL-MCNC: 4.2 MMOL/L — SIGNIFICANT CHANGE UP (ref 3.5–5.3)
POTASSIUM SERPL-SCNC: 4.2 MMOL/L — SIGNIFICANT CHANGE UP (ref 3.5–5.3)
RBC # BLD: 3.47 M/UL — LOW (ref 3.8–5.2)
RBC # FLD: 15.1 % — HIGH (ref 10.3–14.5)
SODIUM SERPL-SCNC: 141 MMOL/L — SIGNIFICANT CHANGE UP (ref 135–145)
TROPONIN T SERPL-MCNC: <0.01 NG/ML — SIGNIFICANT CHANGE UP (ref 0–0.06)
WBC # BLD: 7.85 K/UL — SIGNIFICANT CHANGE UP (ref 3.8–10.5)
WBC # FLD AUTO: 7.85 K/UL — SIGNIFICANT CHANGE UP (ref 3.8–10.5)

## 2021-07-29 PROCEDURE — 93010 ELECTROCARDIOGRAM REPORT: CPT

## 2021-07-29 RX ADMIN — UMECLIDINIUM 1 PUFF(S): 62.5 AEROSOL, POWDER ORAL at 07:45

## 2021-07-29 RX ADMIN — PANTOPRAZOLE SODIUM 40 MILLIGRAM(S): 20 TABLET, DELAYED RELEASE ORAL at 05:40

## 2021-07-29 RX ADMIN — TRAMADOL HYDROCHLORIDE 50 MILLIGRAM(S): 50 TABLET ORAL at 07:51

## 2021-07-29 RX ADMIN — Medication 975 MILLIGRAM(S): at 06:50

## 2021-07-29 RX ADMIN — Medication 975 MILLIGRAM(S): at 18:22

## 2021-07-29 RX ADMIN — Medication 975 MILLIGRAM(S): at 14:13

## 2021-07-29 RX ADMIN — FLUTICASONE FUROATE AND VILANTEROL TRIFENATATE 1 PUFF(S): 100; 25 POWDER RESPIRATORY (INHALATION) at 07:45

## 2021-07-29 RX ADMIN — METHOCARBAMOL 750 MILLIGRAM(S): 500 TABLET, FILM COATED ORAL at 22:07

## 2021-07-29 RX ADMIN — TRAMADOL HYDROCHLORIDE 50 MILLIGRAM(S): 50 TABLET ORAL at 08:00

## 2021-07-29 RX ADMIN — Medication 975 MILLIGRAM(S): at 18:25

## 2021-07-29 RX ADMIN — TRAMADOL HYDROCHLORIDE 50 MILLIGRAM(S): 50 TABLET ORAL at 22:07

## 2021-07-29 RX ADMIN — TRAMADOL HYDROCHLORIDE 50 MILLIGRAM(S): 50 TABLET ORAL at 14:47

## 2021-07-29 RX ADMIN — VALACYCLOVIR 500 MILLIGRAM(S): 500 TABLET, FILM COATED ORAL at 14:10

## 2021-07-29 RX ADMIN — Medication 81 MILLIGRAM(S): at 14:09

## 2021-07-29 RX ADMIN — Medication 975 MILLIGRAM(S): at 05:39

## 2021-07-29 RX ADMIN — TRAMADOL HYDROCHLORIDE 50 MILLIGRAM(S): 50 TABLET ORAL at 23:07

## 2021-07-29 RX ADMIN — METHOCARBAMOL 750 MILLIGRAM(S): 500 TABLET, FILM COATED ORAL at 14:10

## 2021-07-29 RX ADMIN — SENNA PLUS 2 TABLET(S): 8.6 TABLET ORAL at 22:07

## 2021-07-29 RX ADMIN — METHOCARBAMOL 750 MILLIGRAM(S): 500 TABLET, FILM COATED ORAL at 05:39

## 2021-07-29 RX ADMIN — PRIMIDONE 50 MILLIGRAM(S): 250 TABLET ORAL at 18:23

## 2021-07-29 RX ADMIN — Medication 1 SPRAY(S): at 05:40

## 2021-07-29 RX ADMIN — Medication 975 MILLIGRAM(S): at 14:09

## 2021-07-29 RX ADMIN — Medication 1 SPRAY(S): at 18:23

## 2021-07-29 RX ADMIN — MONTELUKAST 10 MILLIGRAM(S): 4 TABLET, CHEWABLE ORAL at 18:22

## 2021-07-29 NOTE — DIETITIAN INITIAL EVALUATION ADULT. - PERTINENT MEDS FT
MEDICATIONS  (STANDING):  acetaminophen   Tablet .. 975 milliGRAM(s) Oral every 6 hours  aspirin enteric coated 81 milliGRAM(s) Oral daily  dextrose 5% + sodium chloride 0.45%. 1000 milliLiter(s) (80 mL/Hr) IV Continuous <Continuous>  fluticasone furoate/vilanterol 200-25 MICROgram(s) Inhaler 1 Puff(s) Inhalation daily  fluticasone propionate 50 MICROgram(s)/spray Nasal Spray 1 Spray(s) Both Nostrils two times a day  methocarbamol 750 milliGRAM(s) Oral three times a day  montelukast 10 milliGRAM(s) Oral daily  pantoprazole    Tablet 40 milliGRAM(s) Oral before breakfast  primidone 50 milliGRAM(s) Oral daily  senna 2 Tablet(s) Oral at bedtime  umeclidinium 62.5 MICROgram(s) Inhaler 1 Puff(s) Inhalation daily  valACYclovir 500 milliGRAM(s) Oral daily    MEDICATIONS  (PRN):  albuterol/ipratropium for Nebulization 3 milliLiter(s) Nebulizer every 6 hours PRN Shortness of Breath and/or Wheezing  ALPRAZolam 0.5 milliGRAM(s) Oral every 8 hours PRN anxiety  aluminum hydroxide/magnesium hydroxide/simethicone Suspension 30 milliLiter(s) Oral every 12 hours PRN Indigestion  magnesium hydroxide Suspension 30 milliLiter(s) Oral every 12 hours PRN Constipation  ondansetron Injectable 4 milliGRAM(s) IV Push every 6 hours PRN Nausea  traMADol 50 milliGRAM(s) Oral every 4 hours PRN Mild Pain (1 - 3)  traMADol 100 milliGRAM(s) Oral every 6 hours PRN Severe Pain (7 - 10)

## 2021-07-29 NOTE — DIETITIAN INITIAL EVALUATION ADULT. - PERTINENT LABORATORY DATA
07-29 Na141 mmol/L Glu 93 mg/dL K+ 4.2 mmol/L Cr  0.62 mg/dL BUN 17.4 mg/dL Phos n/a   Alb n/a   PAB n/a

## 2021-07-29 NOTE — PROGRESS NOTE ADULT - SUBJECTIVE AND OBJECTIVE BOX
HPI:     Allergies    azithromycin (Diarrhea)  contrast media (gadolinium-based) (Unknown)  contrast media (iodine-based) (Unknown)  sulfa drugs (Rash)    Intolerances      Lumbar stenosis with neurogenic claudication    COVID-19 vaccine series completed    Tracheomalacia    Glottic stenosis    Asthma    Osteopenia    Pulmonary stenosis    Sarcoidosis      MEDICATIONS  (STANDING):  acetaminophen   Tablet .. 975 milliGRAM(s) Oral every 6 hours  aspirin enteric coated 81 milliGRAM(s) Oral daily  dextrose 5% + sodium chloride 0.45%. 1000 milliLiter(s) (80 mL/Hr) IV Continuous <Continuous>  fluticasone furoate/vilanterol 200-25 MICROgram(s) Inhaler 1 Puff(s) Inhalation daily  fluticasone propionate 50 MICROgram(s)/spray Nasal Spray 1 Spray(s) Both Nostrils two times a day  methocarbamol 750 milliGRAM(s) Oral three times a day  montelukast 10 milliGRAM(s) Oral daily  pantoprazole    Tablet 40 milliGRAM(s) Oral before breakfast  primidone 50 milliGRAM(s) Oral daily  senna 2 Tablet(s) Oral at bedtime  umeclidinium 62.5 MICROgram(s) Inhaler 1 Puff(s) Inhalation daily  valACYclovir 500 milliGRAM(s) Oral daily    MEDICATIONS  (PRN):  albuterol/ipratropium for Nebulization 3 milliLiter(s) Nebulizer every 6 hours PRN Shortness of Breath and/or Wheezing  ALPRAZolam 0.5 milliGRAM(s) Oral every 8 hours PRN anxiety  aluminum hydroxide/magnesium hydroxide/simethicone Suspension 30 milliLiter(s) Oral every 12 hours PRN Indigestion  magnesium hydroxide Suspension 30 milliLiter(s) Oral every 12 hours PRN Constipation  ondansetron Injectable 4 milliGRAM(s) IV Push every 6 hours PRN Nausea  traMADol 50 milliGRAM(s) Oral every 4 hours PRN Mild Pain (1 - 3)  traMADol 100 milliGRAM(s) Oral every 6 hours PRN Severe Pain (7 - 10)                           11.0   7.85  )-----------( 234      ( 29 Jul 2021 03:55 )             33.3     07-29    141  |  106  |  17.4  ----------------------------<  93  4.2   |  25.0  |  0.62    Ca    8.5<L>      29 Jul 2021 03:55        ;  Vital Signs Last 24 Hrs  T(C): 36.7 (29 Jul 2021 10:58), Max: 37.1 (28 Jul 2021 19:07)  T(F): 98.1 (29 Jul 2021 10:58), Max: 98.7 (28 Jul 2021 19:07)  HR: 87 (29 Jul 2021 10:58) (75 - 90)  BP: 114/64 (29 Jul 2021 10:58) (111/75 - 114/69)  BP(mean): --  RR: 18 (29 Jul 2021 10:58) (18 - 18)  SpO2: 94% (29 Jul 2021 10:58) (94% - 96%)  CAPILLARY BLOOD GLUCOSE      07-28 @ 07:01  -  07-29 @ 07:00  --------------------------------------------------------  IN: 0 mL / OUT: 1800 mL / NET: -1800 mL    Patient denies CP, No SOB, No N/V mod pain   HEENT: PEARLA  Neck: Supple  Cardio: S1 S2 No Murmur  Pulm: CTA No Rales or Ronchi Fair air entry   Abd: Soft NT ND BS+  Back - Bandage clean   Rectal Breast Pelvic - refused  Ext: No DCT  Skin: No Rash  Neuro: Awake Pleasant    Post op surgery for Lumbar spondylosis with moderate to severe stenosis at L4-L5  - post op pain control   Hypotension - mild increase salt dc diltiazem improved   Tachycardia - minimal  Asthma / Sarcoidosis - Albuterol Breo Ellipta and anticholinergic   Anxiety - valium   Hyperglycemia - stress induced diet only   Stable for DC

## 2021-07-29 NOTE — CHART NOTE - NSCHARTNOTEFT_GEN_A_CORE
Late Entry: patient seen at approximately 1930. Notified by RN that patient c/o "left-sided chest muscle pain." Patient seen immediately after. Patient states she had this pain for about 2 hours but didn't tell Dr. Angulo when he was in the room. She states it doesn't radiate. States she had a similar pain when she first came to the hospital, had a normal EKG and it resolved. No respiratory distress. No acute motor or sensory changes. As per Dr. Angulo, repeat stat EKG and troponin ordered.       - EKG NSR, 99bpm  - troponin pending

## 2021-07-29 NOTE — DIETITIAN INITIAL EVALUATION ADULT. - OTHER INFO
60 year old female with PMH of sarcoidosis, pulmonary stenosis, osteopenia, asthma, glottic stenosis, and lumbar stenosis now s/p lumbar laminectomy L3, L4, L5 with neurogenic claudication. Pt reports good appetite/po intake prior to admission and currently. Reports her UBW was ~135 lbs and over the last few years has gradually been putting on weight. Reports she did have COVID which caused her to lose ~12 lbs but has since gained all the weight back. Provided pt with verbal and written literature on general healthful nutrition and emphasized the importance of portion control. All nutrition-related questions/concerns addressed. RD to follow up.

## 2021-07-29 NOTE — PROGRESS NOTE ADULT - SUBJECTIVE AND OBJECTIVE BOX
Ortho Post Op Check    Name: NELDA GARZA    MR #: [id number]    Procedure: Laminectomy L3-5  Surgeon: Dr Duke     Pt uncomfortable has pain yet now accepting tramadol for pain control   Denies CP, SOB, N/V. Complaining of mild paresthesias top of left foot      General Exam:  Vital Signs Last 24 Hrs  T(C): 36.8 (07-29-21 @ 06:12), Max: 36.8 (07-29-21 @ 06:12)  T(F): 98.2 (07-29-21 @ 06:12), Max: 98.2 (07-29-21 @ 06:12)  HR: 75 (07-29-21 @ 06:12) (75 - 75)  BP: 111/75 (07-29-21 @ 06:12) (111/75 - 111/75)  BP(mean): --  RR: 18 (07-29-21 @ 06:12) (18 - 18)  SpO2: 95% (07-29-21 @ 06:12) (95% - 95%)    General: Pt Alert and oriented, NAD, controlled pain.  Spinal Dressings C/D/I. No bleeding.  Pulses: 2+ dorsalis pedis pulse. Cap refill < 2 sec.  Motor: +5/5 B/L LE   Calves soft, supple and nontender         MEDICATIONS  (STANDING):  acetaminophen   Tablet .. 975 milliGRAM(s) Oral every 6 hours  aspirin enteric coated 81 milliGRAM(s) Oral daily  dextrose 5% + sodium chloride 0.45%. 1000 milliLiter(s) (80 mL/Hr) IV Continuous <Continuous>  fluticasone furoate/vilanterol 200-25 MICROgram(s) Inhaler 1 Puff(s) Inhalation daily  fluticasone propionate 50 MICROgram(s)/spray Nasal Spray 1 Spray(s) Both Nostrils two times a day  methocarbamol 750 milliGRAM(s) Oral three times a day  montelukast 10 milliGRAM(s) Oral daily  pantoprazole    Tablet 40 milliGRAM(s) Oral before breakfast  primidone 50 milliGRAM(s) Oral daily  senna 2 Tablet(s) Oral at bedtime  umeclidinium 62.5 MICROgram(s) Inhaler 1 Puff(s) Inhalation daily  valACYclovir 500 milliGRAM(s) Oral daily    MEDICATIONS  (PRN):  albuterol/ipratropium for Nebulization 3 milliLiter(s) Nebulizer every 6 hours PRN Shortness of Breath and/or Wheezing  ALPRAZolam 0.5 milliGRAM(s) Oral every 8 hours PRN anxiety  aluminum hydroxide/magnesium hydroxide/simethicone Suspension 30 milliLiter(s) Oral every 12 hours PRN Indigestion  magnesium hydroxide Suspension 30 milliLiter(s) Oral every 12 hours PRN Constipation  ondansetron Injectable 4 milliGRAM(s) IV Push every 6 hours PRN Nausea  traMADol 50 milliGRAM(s) Oral every 4 hours PRN Mild Pain (1 - 3)  traMADol 100 milliGRAM(s) Oral every 6 hours PRN Severe Pain (7 - 10)      A/P: 60yFemale POD#3 s/p Lumbar laminectomy L3-5  - Stable  - Pain Control  - DVT ppx: ASA  - Weight bearing status: WBAT w assistance   - Pain control, OT/PT, encourage use of bathroom and DC primafit

## 2021-07-30 ENCOUNTER — TRANSCRIPTION ENCOUNTER (OUTPATIENT)
Age: 61
End: 2021-07-30

## 2021-07-30 VITALS
OXYGEN SATURATION: 96 % | DIASTOLIC BLOOD PRESSURE: 87 MMHG | HEART RATE: 113 BPM | TEMPERATURE: 98 F | SYSTOLIC BLOOD PRESSURE: 119 MMHG | RESPIRATION RATE: 18 BRPM

## 2021-07-30 RX ORDER — TRAMADOL HYDROCHLORIDE 50 MG/1
1 TABLET ORAL
Qty: 42 | Refills: 0
Start: 2021-07-30

## 2021-07-30 RX ORDER — SENNOSIDES/DOCUSATE SODIUM 8.6MG-50MG
2 TABLET ORAL
Qty: 20 | Refills: 0
Start: 2021-07-30

## 2021-07-30 RX ORDER — ASPIRIN/CALCIUM CARB/MAGNESIUM 324 MG
1 TABLET ORAL
Qty: 28 | Refills: 0
Start: 2021-07-30 | End: 2021-08-26

## 2021-07-30 RX ORDER — METHOCARBAMOL 500 MG/1
1 TABLET, FILM COATED ORAL
Qty: 10 | Refills: 0
Start: 2021-07-30

## 2021-07-30 RX ORDER — ACETAMINOPHEN 500 MG
3 TABLET ORAL
Qty: 0 | Refills: 0 | DISCHARGE
Start: 2021-07-30

## 2021-07-30 RX ADMIN — METHOCARBAMOL 750 MILLIGRAM(S): 500 TABLET, FILM COATED ORAL at 05:14

## 2021-07-30 RX ADMIN — Medication 975 MILLIGRAM(S): at 12:29

## 2021-07-30 RX ADMIN — Medication 975 MILLIGRAM(S): at 12:30

## 2021-07-30 RX ADMIN — PANTOPRAZOLE SODIUM 40 MILLIGRAM(S): 20 TABLET, DELAYED RELEASE ORAL at 05:14

## 2021-07-30 RX ADMIN — TRAMADOL HYDROCHLORIDE 50 MILLIGRAM(S): 50 TABLET ORAL at 02:46

## 2021-07-30 RX ADMIN — Medication 975 MILLIGRAM(S): at 09:43

## 2021-07-30 RX ADMIN — UMECLIDINIUM 1 PUFF(S): 62.5 AEROSOL, POWDER ORAL at 09:39

## 2021-07-30 RX ADMIN — Medication 975 MILLIGRAM(S): at 03:26

## 2021-07-30 RX ADMIN — TRAMADOL HYDROCHLORIDE 50 MILLIGRAM(S): 50 TABLET ORAL at 03:26

## 2021-07-30 RX ADMIN — TRAMADOL HYDROCHLORIDE 50 MILLIGRAM(S): 50 TABLET ORAL at 09:41

## 2021-07-30 RX ADMIN — Medication 81 MILLIGRAM(S): at 12:28

## 2021-07-30 RX ADMIN — Medication 975 MILLIGRAM(S): at 02:51

## 2021-07-30 RX ADMIN — FLUTICASONE FUROATE AND VILANTEROL TRIFENATATE 1 PUFF(S): 100; 25 POWDER RESPIRATORY (INHALATION) at 09:40

## 2021-07-30 RX ADMIN — VALACYCLOVIR 500 MILLIGRAM(S): 500 TABLET, FILM COATED ORAL at 12:28

## 2021-07-30 RX ADMIN — Medication 975 MILLIGRAM(S): at 09:45

## 2021-07-30 RX ADMIN — Medication 1 SPRAY(S): at 05:14

## 2021-07-30 RX ADMIN — TRAMADOL HYDROCHLORIDE 50 MILLIGRAM(S): 50 TABLET ORAL at 09:38

## 2021-07-30 NOTE — PROGRESS NOTE ADULT - PROVIDER SPECIALTY LIST ADULT
Family Medicine
Family Medicine
Orthopedics
Orthopedics
Family Medicine
Orthopedics

## 2021-07-30 NOTE — PROGRESS NOTE ADULT - SUBJECTIVE AND OBJECTIVE BOX
Patient seen and examined at bedside. comfortable in bed, pain currently controlled. Patient states yesterday she was having pain going down the right side to her thigh, but states currently has improved. Patient also c/o paresthesia to top of right foot. Patient also c/o "blister" to right thigh. Denies fever/chills, SOB/chest pain, abdominal pain, NO other complaints at this time.    Vital Signs Last 24 Hrs  T(C): 37 (30 Jul 2021 05:19), Max: 37.6 (29 Jul 2021 17:35)  T(F): 98.6 (30 Jul 2021 05:19), Max: 99.6 (29 Jul 2021 17:35)  HR: 97 (30 Jul 2021 05:19) (87 - 101)  BP: 107/65 (30 Jul 2021 05:19) (107/65 - 128/86)  BP(mean): --  RR: 18 (30 Jul 2021 05:19) (18 - 18)  SpO2: 97% (30 Jul 2021 05:19) (94% - 97%)    Back: Dressing C/D/I  LE: 5/5 motor strength thoughout. Patient reports mild paresthesia to light touch to dorsal aspect of right foot. otherwise SILT B/L. ext warm cap refill brisk b/l. calf soft NT B/L. + small dressing noted to right medial thigh (placed by RN as per patient). Dressing removed, + small in tact blister to medial aspect of thigh, no erythema. possibly due to rubbing against prima-fit? dressing replaced                          11.0   7.85  )-----------( 234      ( 29 Jul 2021 03:55 )             33.3     A/P: 60 y.o F s/p L3-L5 lami POD #4  D/W Dr. Duke  WBAT  DVTP  d/c primafit  dressing changes discussed with patient at home, f/u PCP  d/c plannning - home today if cleared by PT and medicine Patient seen and examined at bedside. comfortable in bed, pain currently controlled. Patient states yesterday she was having pain going down the right side to her thigh, but states currently has improved. Patient also c/o paresthesia to top of right foot. Patient also c/o "blister" to right thigh. Denies fever/chills, SOB/chest pain, abdominal pain, NO other complaints at this time.    Vital Signs Last 24 Hrs  T(C): 37 (30 Jul 2021 05:19), Max: 37.6 (29 Jul 2021 17:35)  T(F): 98.6 (30 Jul 2021 05:19), Max: 99.6 (29 Jul 2021 17:35)  HR: 97 (30 Jul 2021 05:19) (87 - 101)  BP: 107/65 (30 Jul 2021 05:19) (107/65 - 128/86)  BP(mean): --  RR: 18 (30 Jul 2021 05:19) (18 - 18)  SpO2: 97% (30 Jul 2021 05:19) (94% - 97%)    Back: Dressing C/D/I  LE: 5/5 motor strength thoughout. Patient reports mild paresthesia to light touch to dorsal aspect of right foot. otherwise SILT B/L. ext warm cap refill brisk b/l. calf soft NT B/L. + small dressing noted to right medial thigh (placed by RN as per patient). Dressing removed, + small in tact blister to medial aspect of thigh, no erythema. possibly due to rubbing against prima-fit? no ulcerations, no clusters. dressing replaced                          11.0   7.85  )-----------( 234      ( 29 Jul 2021 03:55 )             33.3     A/P: 60 y.o F s/p L3-L5 lami POD #4  D/W Dr. Duke  WBAT  DVTP  d/c primafit  dressing changes discussed with patient at home, f/u PCP  d/c plannning - home today if cleared by PT and medicine

## 2021-07-30 NOTE — DISCHARGE NOTE NURSING/CASE MANAGEMENT/SOCIAL WORK - PATIENT PORTAL LINK FT
You can access the FollowMyHealth Patient Portal offered by [facility] by registering at the following website: [email or web address]. By joining [facility]’s FollowMyHealth portal, you will also be able to view your health information using other applications (apps) compatible with our system.

## 2021-08-03 ENCOUNTER — APPOINTMENT (OUTPATIENT)
Dept: ORTHOPEDIC SURGERY | Facility: CLINIC | Age: 61
End: 2021-08-03
Payer: MEDICAID

## 2021-08-03 PROCEDURE — 99024 POSTOP FOLLOW-UP VISIT: CPT

## 2021-08-03 PROCEDURE — 72100 X-RAY EXAM L-S SPINE 2/3 VWS: CPT

## 2021-08-03 NOTE — ADDENDUM
[FreeTextEntry1] : Documented by Gianluca Gates acting as a scribe for Dr. Richard Duke on 08/03/2021. All medical record entries made by the Scribe were at my, Dr. Richard Duke, direction and personally dictated by me on 08/03/2021 . I have reviewed the chart and agree that the record accurately reflects my personal performance of the history, physical exam, assessment and plan. I have also personally directed, reviewed, and agreed with the chart.

## 2021-08-03 NOTE — HISTORY OF PRESENT ILLNESS
[Clean/Dry/Intact] : clean, dry and intact [Healed] : healed [Neuro Intact] : an unremarkable neurological exam [Vascular Intact] : ~T peripheral vascular exam normal [Doing Well] : is doing well [Excellent Pain Control] : has excellent pain control [No Sign of Infection] : is showing no signs of infection [Chills] : no chills [Fever] : no fever [Erythema] : not erythematous [Discharge] : absent of discharge [Swelling] : not swollen [Dehiscence] : not dehisced [de-identified] : S/p lumbar laminectomy at L4 and L5, partial L3 laminectomy. DOS: 07- [de-identified] : 60 year old F 1 week S/p L4 laminectomy, states LLE sciatica is resolved, there is left dorsum of the foot paresthesia, pain does not radiate but is isolated to the dorsum.  [de-identified] : constitutional- No acute distress\par neurologic- no LE radiculitis.\par skin- incision dry clean and intact. The incision was well healed. Incision was cleansed with Chlora-prep and a new dressing was applied. \par musculoskeletal - motor strength is 5/5.  [de-identified] : Xray of the lumbar spine taken 08/03/2021 demonstrates S/p lumbar laminectomy, no acute spondylolisthesis.  [de-identified] : S/p lumbar laminectomy at L4 and L5, partial L3 laminectomy [de-identified] : Continuation of home therapy was discussed at length with the patient and recommended at this time. We will refill her medications at this time. Patient to ween off of walker. The patient will follow up in three weeks for repeat clinical assessment at which time we can discuss out patient PT.

## 2021-08-27 ENCOUNTER — APPOINTMENT (OUTPATIENT)
Dept: ORTHOPEDIC SURGERY | Facility: CLINIC | Age: 61
End: 2021-08-27
Payer: MEDICAID

## 2021-08-27 PROCEDURE — 99024 POSTOP FOLLOW-UP VISIT: CPT

## 2021-08-27 PROCEDURE — 72100 X-RAY EXAM L-S SPINE 2/3 VWS: CPT

## 2021-08-27 NOTE — ADDENDUM
[FreeTextEntry1] : Documented by Gianluca Gates acting as a scribe for Dr. Richard Duke on 08/27/2021. All medical record entries made by the Scribe were at my, Dr. Richard Duke, direction and personally dictated by me on 08/27/2021 . I have reviewed the chart and agree that the record accurately reflects my personal performance of the history, physical exam, assessment and plan. I have also personally directed, reviewed, and agreed with the chart.

## 2021-08-27 NOTE — HISTORY OF PRESENT ILLNESS
[Clean/Dry/Intact] : clean, dry and intact [Healed] : healed [Neuro Intact] : an unremarkable neurological exam [Vascular Intact] : ~T peripheral vascular exam normal [Doing Well] : is doing well [Excellent Pain Control] : has excellent pain control [No Sign of Infection] : is showing no signs of infection [Chills] : no chills [Fever] : no fever [Erythema] : not erythematous [Discharge] : absent of discharge [Swelling] : not swollen [Dehiscence] : not dehisced [de-identified] : 1 month S/p lumbar laminectomy at L4 and L5, partial L3 laminectomy. DOS: 07- [de-identified] : 60 year old F 1 month S/p L4 laminectomy for moderate / severe  stenosis and urinary incontinence. States LLE sciatica is resolved, there is left dorsum of the foot paresthesia, pain does not radiate but is isolated to the dorsum. She is using a walker for comfort. States she had a single episode of incontinence recently, due to the intermittent nature I do not think this is related to her surgery.  [de-identified] : constitutional- No acute distress\par neurologic- no LE radiculitis. Patient states is mild radiculopathy on the top of her right foot\par skin- incision dry clean and intact. The incision was well healed.\par musculoskeletal - motor strength is 5/5.  [de-identified] : S/p lumbar laminectomy, no acute spondylolisthesis.  [de-identified] : S/p lumbar laminectomy at L4 and L5, partial L3 laminectomy [de-identified] : Issues can be enrolled in physical therapy for lower extremity gait and balance training. Patient followup in approximately 6 weeks.\par I discussed the role for lumbar laminectomy patient seems discomfort that she is thoroughly having urinary signs and symptoms I discussed with her preoperatively her stenosis is causing this this may be permanent. I believe the role of a lumbar laminectomy was to completely remove the role of spinal stenosis as a cause of urinary incontinence a minor. This has been accomplished successfully. Patient is to followup with her primary care urologist for further urodynamic testing.

## 2021-09-03 ENCOUNTER — NON-APPOINTMENT (OUTPATIENT)
Age: 61
End: 2021-09-03

## 2021-09-09 ENCOUNTER — RX RENEWAL (OUTPATIENT)
Age: 61
End: 2021-09-09

## 2021-09-15 ENCOUNTER — NON-APPOINTMENT (OUTPATIENT)
Age: 61
End: 2021-09-15

## 2021-09-30 ENCOUNTER — APPOINTMENT (OUTPATIENT)
Dept: OTOLARYNGOLOGY | Facility: CLINIC | Age: 61
End: 2021-09-30
Payer: MEDICAID

## 2021-09-30 VITALS
HEART RATE: 92 BPM | HEIGHT: 66 IN | SYSTOLIC BLOOD PRESSURE: 128 MMHG | BODY MASS INDEX: 33.43 KG/M2 | WEIGHT: 208 LBS | DIASTOLIC BLOOD PRESSURE: 78 MMHG

## 2021-09-30 PROCEDURE — 99214 OFFICE O/P EST MOD 30 MIN: CPT | Mod: 25

## 2021-09-30 PROCEDURE — 31579 LARYNGOSCOPY TELESCOPIC: CPT

## 2021-09-30 RX ORDER — AZELASTINE HYDROCHLORIDE 137 UG/1
0.1 SPRAY, METERED NASAL
Qty: 1 | Refills: 2 | Status: ACTIVE | COMMUNITY
Start: 2020-11-12 | End: 1900-01-01

## 2021-09-30 RX ORDER — FLUTICASONE PROPIONATE 50 UG/1
50 SPRAY, METERED NASAL
Qty: 16 | Refills: 11 | Status: ACTIVE | COMMUNITY
Start: 2020-02-11 | End: 1900-01-01

## 2021-09-30 NOTE — HISTORY OF PRESENT ILLNESS
[de-identified] : 61yo F here for f/u laryngospasm and coughing. States had back surgery 07/26/2021. States breathing has been stable since last visit. Reports post nasal drip especially when laying flat. States using purple bed and loves it. Has stable increased snorting cough. States continues Protonix BID, famotidine nightly and nasal sprays, needs refill. [place] Her voice was okay after surgery but has been hoarse intermittently. She saw the urologist who felt she has overactive bladder syndrome.

## 2021-09-30 NOTE — CONSULT LETTER
[Dear  ___] : Dear  [unfilled], [Consult Letter:] : I had the pleasure of evaluating your patient, [unfilled]. [Please see my note below.] : Please see my note below. [Consult Closing:] : Thank you very much for allowing me to participate in the care of this patient.  If you have any questions, please do not hesitate to contact me. [Sincerely,] : Sincerely, [FreeTextEntry3] : Davon Morris MD, PhD\par Chief, Division of Laryngology\par Department of Otolaryngology\par [facility]\par Pediatric Otolaryngology, [facility]\par  of Otolaryngology\par [facility] [employer] [facility]\par

## 2021-10-01 ENCOUNTER — APPOINTMENT (OUTPATIENT)
Dept: ORTHOPEDIC SURGERY | Facility: CLINIC | Age: 61
End: 2021-10-01
Payer: MEDICAID

## 2021-10-01 DIAGNOSIS — Z98.890 OTHER SPECIFIED POSTPROCEDURAL STATES: ICD-10-CM

## 2021-10-01 DIAGNOSIS — M48.062 SPINAL STENOSIS, LUMBAR REGION WITH NEUROGENIC CLAUDICATION: ICD-10-CM

## 2021-10-01 DIAGNOSIS — G62.9 POLYNEUROPATHY, UNSPECIFIED: ICD-10-CM

## 2021-10-01 PROCEDURE — 99024 POSTOP FOLLOW-UP VISIT: CPT

## 2021-10-01 NOTE — ADDENDUM
[FreeTextEntry1] : Documented by Gianluca Gates acting as a scribe for Dr. Richard Duke on 10/01/2021. All medical record entries made by the Scribe were at my, Dr. Richard Duke, direction and personally dictated by me on 10/01/2021 . I have reviewed the chart and agree that the record accurately reflects my personal performance of the history, physical exam, assessment and plan. I have also personally directed, reviewed, and agreed with the chart.

## 2021-10-01 NOTE — HISTORY OF PRESENT ILLNESS
[Clean/Dry/Intact] : clean, dry and intact [Healed] : healed [Neuro Intact] : an unremarkable neurological exam [Vascular Intact] : ~T peripheral vascular exam normal [Doing Well] : is doing well [Excellent Pain Control] : has excellent pain control [No Sign of Infection] : is showing no signs of infection [Chills] : no chills [Fever] : no fever [Erythema] : not erythematous [Discharge] : absent of discharge [Swelling] : not swollen [Dehiscence] : not dehisced [de-identified] : S/p L4 and L5 laminectomy, partial laminectomy of L3. DOS: 07- [de-identified] : 60 year old F S/p lumbar laminectomy. States she still suffers from mechanically oriented lower back pain with transitions. There are some paresthesia of the right lateral calf. She has been diagnosed with overactive bladder syndrome.  [de-identified] : constitutional- No acute distress\par neurologic- subjective RLE radiculopathy. \par skin- incision dry clean and intact. The incision was well healed. Incision was cleansed with Chlora-prep and a new dressing was applied. \par musculoskeletal - motor strength is 5/5. Rises from seated to standing with minimal apprehension, ambulates with minimal antalgia.  [de-identified] : Xray of the lumbar spine taken 10/01/2021 demonstrates S/p laminectomy  [de-identified] : S/p laminectomy  [de-identified] : Currently in the process of [place], I would still like to see her in two months. Patient has been referred to physical therapy for decreased pain modalities, stretching and strengthening modalities, soft tissue modalities, and physical modalities. Patient was instructed to start home exercises, core strengthening and a sheet was provided. I believe her anxiety is contributing to her pain, continue with therapy and anxiety management regiment.

## 2021-10-02 ENCOUNTER — RX RENEWAL (OUTPATIENT)
Age: 61
End: 2021-10-02

## 2021-10-03 ENCOUNTER — RX RENEWAL (OUTPATIENT)
Age: 61
End: 2021-10-03

## 2021-11-22 ENCOUNTER — FORM ENCOUNTER (OUTPATIENT)
Age: 61
End: 2021-11-22

## 2021-11-23 ENCOUNTER — FORM ENCOUNTER (OUTPATIENT)
Age: 61
End: 2021-11-23

## 2022-01-23 ENCOUNTER — RX RENEWAL (OUTPATIENT)
Age: 62
End: 2022-01-23

## 2022-02-03 ENCOUNTER — NON-APPOINTMENT (OUTPATIENT)
Age: 62
End: 2022-02-03

## 2022-04-24 ENCOUNTER — RX RENEWAL (OUTPATIENT)
Age: 62
End: 2022-04-24

## 2023-01-04 ENCOUNTER — APPOINTMENT (OUTPATIENT)
Dept: RHEUMATOLOGY | Facility: CLINIC | Age: 63
End: 2023-01-04
Payer: MEDICAID

## 2023-01-04 ENCOUNTER — LABORATORY RESULT (OUTPATIENT)
Age: 63
End: 2023-01-04

## 2023-01-04 VITALS
DIASTOLIC BLOOD PRESSURE: 80 MMHG | HEART RATE: 112 BPM | WEIGHT: 210 LBS | BODY MASS INDEX: 34.99 KG/M2 | OXYGEN SATURATION: 97 % | SYSTOLIC BLOOD PRESSURE: 126 MMHG | RESPIRATION RATE: 16 BRPM | HEIGHT: 65 IN

## 2023-01-04 PROCEDURE — 20610 DRAIN/INJ JOINT/BURSA W/O US: CPT | Mod: RT

## 2023-01-04 PROCEDURE — 99214 OFFICE O/P EST MOD 30 MIN: CPT | Mod: 25

## 2023-01-04 RX ORDER — ESTRADIOL 0.05 MG/D
0.05 PATCH TRANSDERMAL
Qty: 4 | Refills: 3 | Status: DISCONTINUED | COMMUNITY
Start: 2021-06-17 | End: 2023-01-04

## 2023-01-04 RX ORDER — OXYCODONE 5 MG/1
5 TABLET ORAL EVERY 4 HOURS
Qty: 42 | Refills: 0 | Status: DISCONTINUED | COMMUNITY
Start: 2021-08-03 | End: 2023-01-04

## 2023-01-04 RX ORDER — TIZANIDINE 4 MG/1
4 TABLET ORAL
Qty: 30 | Refills: 0 | Status: DISCONTINUED | COMMUNITY
Start: 2021-06-17 | End: 2023-01-04

## 2023-01-04 RX ORDER — MELOXICAM 15 MG/1
15 TABLET ORAL
Qty: 30 | Refills: 0 | Status: DISCONTINUED | COMMUNITY
Start: 2021-06-17 | End: 2023-01-04

## 2023-01-04 RX ORDER — METHOCARBAMOL 750 MG/1
750 TABLET, FILM COATED ORAL
Qty: 21 | Refills: 0 | Status: DISCONTINUED | COMMUNITY
Start: 2021-08-03 | End: 2023-01-04

## 2023-01-04 RX ORDER — PANTOPRAZOLE SODIUM 40 MG/1
40 GRANULE, DELAYED RELEASE ORAL
Refills: 0 | Status: DISCONTINUED | COMMUNITY
End: 2023-01-04

## 2023-01-04 RX ADMIN — METHYLPREDNISOLONE ACETATE MG/ML: 80 INJECTION, SUSPENSION INTRA-ARTICULAR; INTRALESIONAL; INTRAMUSCULAR; SOFT TISSUE at 00:00

## 2023-01-05 RX ORDER — METHYLPRED ACET/NACL,ISO-OS/PF 80 MG/ML
80 VIAL (ML) INJECTION
Qty: 1 | Refills: 0 | Status: COMPLETED | OUTPATIENT
Start: 2023-01-04

## 2023-01-05 NOTE — HISTORY OF PRESENT ILLNESS
[___ Month(s) Ago] : [unfilled] month(s) ago [Currently Experiencing] : currently [FreeTextEntry1] : away for 18 months\par ongoing knee pain - has difficulty moving and stepping up or down - [place]\par has severe pain on walking and bearing weight\par mild swelling -\par new onset of psoriasis - intermittent - not active today\par ongoing nose bleed - pending appt with Arturo\par has gained over 20 lbs [de-identified] : skin sensitivity

## 2023-01-05 NOTE — ASSESSMENT
[FreeTextEntry1] : 60 year-old female with long standing hx of sarcoidosis over the liver, spleen and lungs - s/p splenectomy\par never on systemic treatment\par \par \par 1. Spinal stenosis -s/p laminectomy\par 2. sarcoidosis - not active at this time  check all rheum serologies\par 3. psoriasis - intermittent - not active on this visit\par 4. right knee pain - after a fall - send for x-rays - arthrocentesis today - consider MRI after xrays\par \par \par \par \par I reviewed previous labs results with patients.\par labs today\par Diagnosis and Prognosis discussed\par Continue with current medications\par medications refilled\par f/u as needed\par

## 2023-01-05 NOTE — PHYSICAL EXAM
[General Appearance - Alert] : alert [General Appearance - In No Acute Distress] : in no acute distress [Neck Appearance] : the appearance of the neck was normal [Neck Cervical Mass (___cm)] : no neck mass was observed [Jugular Venous Distention Increased] : there was no jugular-venous distention [Thyroid Diffuse Enlargement] : the thyroid was not enlarged [Thyroid Nodule] : there were no palpable thyroid nodules [Auscultation Breath Sounds / Voice Sounds] : lungs were clear to auscultation bilaterally [Heart Rate And Rhythm] : heart rate was normal and rhythm regular [Heart Sounds] : normal S1 and S2 [Heart Sounds Gallop] : no gallops [Murmurs] : no murmurs [Heart Sounds Pericardial Friction Rub] : no pericardial rub [Full Pulse] : the pedal pulses are present [Edema] : there was no peripheral edema [Bowel Sounds] : normal bowel sounds [Abdomen Soft] : soft [Abdomen Tenderness] : non-tender [Abdomen Mass (___ Cm)] : no abdominal mass palpated [Cervical Lymph Nodes Enlarged Posterior Bilaterally] : posterior cervical [Cervical Lymph Nodes Enlarged Anterior Bilaterally] : anterior cervical [Supraclavicular Lymph Nodes Enlarged Bilaterally] : supraclavicular [No CVA Tenderness] : no ~M costovertebral angle tenderness [No Spinal Tenderness] : no spinal tenderness [Abnormal Walk] : normal gait [Nail Clubbing] : no clubbing  or cyanosis of the fingernails [Musculoskeletal - Swelling] : no joint swelling seen [Motor Tone] : muscle strength and tone were normal [Skin Color & Pigmentation] : normal skin color and pigmentation [Skin Turgor] : normal skin turgor [] : no rash [Oriented To Time, Place, And Person] : oriented to person, place, and time [Impaired Insight] : insight and judgment were intact [Affect] : the affect was normal [FreeTextEntry1] : tenderness to skin to palpation

## 2023-01-05 NOTE — PROCEDURE
[Other Date:___] : Date: [unfilled] [Arthrocentesis] : arthrocentesis was performed [Patient] : the patient [Risks] : risks [Therapeutic] : therapeutic [#1 Site: ______] : #1 site identified in the [unfilled] [Ethyl Chloride] : ethyl chloride [___ ml Inj] : [unfilled] ~Uml [Betadine] : betadine solution [25 gauge 1.5 inch] : A 25 gauge 1.5 inch needle was used [Depomedrol ___ mg] : Depomedrol [unfilled] mg [Tolerated Well] : the patient tolerated the procedure well [No Complications] : there were no complications

## 2023-01-10 LAB
ACE BLD-CCNC: 44 U/L
ALBUMIN SERPL ELPH-MCNC: 4.5 G/DL
ALDOLASE SERPL-CCNC: 2.6 U/L
ALP BLD-CCNC: 116 U/L
ALT SERPL-CCNC: 15 U/L
ANION GAP SERPL CALC-SCNC: 13 MMOL/L
APPEARANCE: CLEAR
AST SERPL-CCNC: 15 U/L
BASOPHILS # BLD AUTO: 0.01 K/UL
BASOPHILS NFR BLD AUTO: 0.2 %
BILIRUB SERPL-MCNC: 0.2 MG/DL
BILIRUBIN URINE: NEGATIVE
BLOOD URINE: NEGATIVE
BUN SERPL-MCNC: 23 MG/DL
C3 SERPL-MCNC: 152 MG/DL
C4 SERPL-MCNC: 34 MG/DL
CALCIUM SERPL-MCNC: 9.6 MG/DL
CCP AB SER IA-ACNC: <8 UNITS
CHLORIDE SERPL-SCNC: 103 MMOL/L
CK SERPL-CCNC: 73 U/L
CO2 SERPL-SCNC: 24 MMOL/L
COLOR: NORMAL
CREAT SERPL-MCNC: 0.87 MG/DL
CREAT SPEC-SCNC: 92 MG/DL
CRP SERPL-MCNC: 16 MG/L
DSDNA AB SER-ACNC: <12 IU/ML
EGFR: 76 ML/MIN/1.73M2
ENA RNP AB SER IA-ACNC: 0.9 AL
ENA SCL70 IGG SER IA-ACNC: <0.2 AL
ENA SM AB SER IA-ACNC: <0.2 AL
ENA SS-A AB SER IA-ACNC: <0.2 AL
ENA SS-B AB SER IA-ACNC: <0.2 AL
EOSINOPHIL # BLD AUTO: 0 K/UL
EOSINOPHIL NFR BLD AUTO: 0 %
ERYTHROCYTE [SEDIMENTATION RATE] IN BLOOD BY WESTERGREN METHOD: 54 MM/HR
FOLATE SERPL-MCNC: >20 NG/ML
GLUCOSE QUALITATIVE U: NEGATIVE
GLUCOSE SERPL-MCNC: 102 MG/DL
HBV CORE IGG+IGM SER QL: NONREACTIVE
HBV SURFACE AB SER QL: NONREACTIVE
HBV SURFACE AG SER QL: NONREACTIVE
HCT VFR BLD CALC: 42 %
HCV AB SER QL: NONREACTIVE
HCV S/CO RATIO: 0.08 S/CO
HGB BLD-MCNC: 13.4 G/DL
IMM GRANULOCYTES NFR BLD AUTO: 0.3 %
IRON SATN MFR SERPL: 16 %
IRON SERPL-MCNC: 53 UG/DL
KETONES URINE: NEGATIVE
LEUKOCYTE ESTERASE URINE: NEGATIVE
LYMPHOCYTES # BLD AUTO: 1.82 K/UL
LYMPHOCYTES NFR BLD AUTO: 27.4 %
M TB IFN-G BLD-IMP: NEGATIVE
MAN DIFF?: NORMAL
MCHC RBC-ENTMCNC: 31.4 PG
MCHC RBC-ENTMCNC: 31.9 GM/DL
MCV RBC AUTO: 98.4 FL
MONOCYTES # BLD AUTO: 0.41 K/UL
MONOCYTES NFR BLD AUTO: 6.2 %
MYOGLOBIN SERPL-MCNC: 23 NG/ML
NEUTROPHILS # BLD AUTO: 4.38 K/UL
NEUTROPHILS NFR BLD AUTO: 65.9 %
NITRITE URINE: NEGATIVE
PH URINE: 6
PLATELET # BLD AUTO: 288 K/UL
POTASSIUM SERPL-SCNC: 4.8 MMOL/L
PROT SERPL-MCNC: 7.1 G/DL
PROTEIN URINE: NEGATIVE
QUANTIFERON TB PLUS MITOGEN MINUS NIL: 5.97 IU/ML
QUANTIFERON TB PLUS NIL: 0.02 IU/ML
QUANTIFERON TB PLUS TB1 MINUS NIL: -0.01 IU/ML
QUANTIFERON TB PLUS TB2 MINUS NIL: -0.01 IU/ML
RBC # BLD: 4.27 M/UL
RBC # FLD: 15.5 %
RF+CCP IGG SER-IMP: NEGATIVE
RHEUMATOID FACT SER QL: <10 IU/ML
SODIUM SERPL-SCNC: 140 MMOL/L
SPECIFIC GRAVITY URINE: 1.01
TIBC SERPL-MCNC: 341 UG/DL
TSH SERPL-ACNC: 1.4 UIU/ML
UIBC SERPL-MCNC: 288 UG/DL
UROBILINOGEN URINE: NORMAL
VIT B12 SERPL-MCNC: 541 PG/ML
WBC # FLD AUTO: 6.64 K/UL

## 2023-01-13 LAB
ARSENIC BLD-MCNC: 2 UG/L
CADMIUM SERPL-MCNC: <0.5 UG/L
HLA-B27 RELATED AG QL: NEGATIVE
LEAD BLD-MCNC: 1 UG/DL
MERCURY BLD-MCNC: <1 UG/L

## 2023-01-19 ENCOUNTER — APPOINTMENT (OUTPATIENT)
Dept: ORTHOPEDIC SURGERY | Facility: CLINIC | Age: 63
End: 2023-01-19
Payer: MEDICARE

## 2023-01-19 VITALS
BODY MASS INDEX: 34.16 KG/M2 | SYSTOLIC BLOOD PRESSURE: 121 MMHG | DIASTOLIC BLOOD PRESSURE: 71 MMHG | HEART RATE: 96 BPM | TEMPERATURE: 98.1 F | WEIGHT: 205 LBS | HEIGHT: 65 IN

## 2023-01-19 DIAGNOSIS — M48.061 SPINAL STENOSIS, LUMBAR REGION WITHOUT NEUROGENIC CLAUDICATION: ICD-10-CM

## 2023-01-19 DIAGNOSIS — M41.80 OTHER FORMS OF SCOLIOSIS, SITE UNSPECIFIED: ICD-10-CM

## 2023-01-19 DIAGNOSIS — M60.9 MYOSITIS, UNSPECIFIED: ICD-10-CM

## 2023-01-19 PROCEDURE — 72100 X-RAY EXAM L-S SPINE 2/3 VWS: CPT

## 2023-01-19 PROCEDURE — 99214 OFFICE O/P EST MOD 30 MIN: CPT

## 2023-01-19 NOTE — PHYSICAL EXAM
[de-identified] : CONSTITUTIONAL: The patient is a very pleasant individual who is well-nourished and who appears stated age.\par PSYCHIATRIC: The patient is alert and oriented X 3 and in no apparent distress, and participates with orthopedic evaluation well.\par HEAD: Atraumatic and is nonsyndromic in appearance.\par EENT: No visible thyromegaly, EOMI.\par RESPIRATORY: Respiratory rate is regular, not dyspneic on examination.\par LYMPHATICS: There is no inguinal lymphadenopathy\par INTEGUMENTARY: Skin is clean, dry, and intact about the bilateral lower extremities and lumbar spine.\par VASCULAR: There is brisk capillary refill about the bilateral lower extremities.\par NEUROLOGIC: There are no pathologic reflexes. There is no decrease in sensation of the bilateral lower extremities on Wartenberg pinwheel examination. Deep tendon reflexes are well maintained at 2+/4 of the bilateral lower extremities and are symmetric..\par MUSCULOSKELETAL: There is no visible muscular atrophy. Manual motor strength is well maintained in the bilateral lower extremities. Range of motion of lumbar spine is well maintained and there is reproducible pain on palpation of lower lumbar area bilateral lumbar paraspinals, greater trochanteric bursitis bilaterally, gluteus tendinopathy bilaterally.. The patient ambulates in a non-myelopathic manner. Negative tension sign and straight leg raise bilaterally. Quad extension, ankle dorsiflexion, EHL, plantar flexion, and ankle eversion are well preserved. Normal secondary orthopaedic exam of bilateral hips, greater trochanteric area, knees and ankles [de-identified] : X-ray 3 views flexion-extension AP lateral were done in the office today January 19, 2023 demonstrate thoracolumbar levoscoliosis.  There is spondylosis decrease in intervertebral disc space diffuse throughout the lumbar spine.\par \par Patient comes with multiple reports from her prior physician.  Pertinent data includes report of an MRI of the lumbar spine done on August 19, 2022, report reads mild S shaped curvature without fracture or subluxation.  Asymmetric left-sided disc height loss L4-L5 with fatty Modic endplate change and no edema.  Postoperative changes consistent with L4-L5 laminectomy.  Broad-based disc bulge facet arthropathy resulting in moderate canal narrowing at L2-L3.  L3-L4 disc bulge facet arthropathy and mild to moderate canal narrowing.  Moderate to severe left foraminal narrowing at L4-L5 due to facet arthropathy.\par \par Report of left shoulder MRI done at an outside location on February 28, 2022 report reads no rotator cuff tear, tenosynovitis of the long head of biceps tendon, mild osteoarthrosis of the AC joint

## 2023-01-19 NOTE — DISCUSSION/SUMMARY
[Medication Risks Reviewed] : Medication risks reviewed [de-identified] : 35 minutes was spent reviewing the x-rays as well as discussing with the patient their clinical presentation, diagnosis and providing education.  Conservative treatment was discussed with the patient at length. Anticipatory guidance regarding disease process thoracolumbar scoliosis, myositis of the lumbar spine, greater trochanteric bursitis and gluteus tendinitis status post a slip and fall, avoidance of acute exacerbation this was discussed at length and all patients commenting concerns were answered to the patient's satisfaction. Physical therapy for decrease pain and increase function was ordered.  Intermittent use of acetaminophen 500 mg 2 tablets t.i.d. p.r.n. mild to moderate pain, ibuprofen 600 mg t.i.d. p.r.n. severe pain with food or milk if there is no medical contraindication. Home exercise including stretching on a daily basis for 20-30 minutes was recommended. Heat, ice, topical were discussed as needed.  Did briefly discussed the idea of adult reconstructive spinal surgery for back pain but we both agree that conservative management should be exhausted prior to any discussion about surgery since her lumbar laminectomy surgery had a very prolonged recuperation period.  He is able to accomplish all her activities of daily living very well and I think physical therapy and possibly pain management if needed will very much control her pain.  The patient will followup in 6-8 weeks at which point in time if symptoms continue we will order MRI studies to guide treatment plan including possible injection therapy with pain management versus surgical option.

## 2023-01-26 ENCOUNTER — LABORATORY RESULT (OUTPATIENT)
Age: 63
End: 2023-01-26

## 2023-01-26 ENCOUNTER — APPOINTMENT (OUTPATIENT)
Dept: PULMONOLOGY | Facility: CLINIC | Age: 63
End: 2023-01-26
Payer: MEDICARE

## 2023-01-26 VITALS
SYSTOLIC BLOOD PRESSURE: 114 MMHG | OXYGEN SATURATION: 97 % | HEART RATE: 101 BPM | DIASTOLIC BLOOD PRESSURE: 73 MMHG | RESPIRATION RATE: 17 BRPM | WEIGHT: 204 LBS | HEIGHT: 65 IN | TEMPERATURE: 97.2 F | BODY MASS INDEX: 33.99 KG/M2

## 2023-01-26 PROCEDURE — 99214 OFFICE O/P EST MOD 30 MIN: CPT

## 2023-01-26 NOTE — ASSESSMENT
[FreeTextEntry1] : Asthma stabled/c Singulair. Start Trelegy if covered by insurance.otherwise will con  Incruse/ Breo to Trelegy\par \par recheck allergy labs\par \par \par Karyn MAYNARD NP, am scribing for and in the presence of Dr. Chad Bob, the following sections HISTORY OF PRESENT ILLNESS, PAST MEDICAL/FAMILY/SOCIAL HISTORY; REVIEW OF SYSTEMS; VITAL SIGNS; PHYSICAL EXAM; DISPOSITION.\par

## 2023-01-27 ENCOUNTER — NON-APPOINTMENT (OUTPATIENT)
Age: 63
End: 2023-01-27

## 2023-01-27 LAB
BASOPHILS # BLD AUTO: 0.01 K/UL
BASOPHILS NFR BLD AUTO: 0.1 %
EOSINOPHIL # BLD AUTO: 0 K/UL
EOSINOPHIL NFR BLD AUTO: 0 %
HCT VFR BLD CALC: 45.6 %
HGB BLD-MCNC: 14.3 G/DL
IMM GRANULOCYTES NFR BLD AUTO: 0.3 %
LYMPHOCYTES # BLD AUTO: 1.64 K/UL
LYMPHOCYTES NFR BLD AUTO: 24.5 %
MAN DIFF?: NORMAL
MCHC RBC-ENTMCNC: 30.8 PG
MCHC RBC-ENTMCNC: 31.4 GM/DL
MCV RBC AUTO: 98.3 FL
MONOCYTES # BLD AUTO: 0.55 K/UL
MONOCYTES NFR BLD AUTO: 8.2 %
NEUTROPHILS # BLD AUTO: 4.47 K/UL
NEUTROPHILS NFR BLD AUTO: 66.9 %
PLATELET # BLD AUTO: 242 K/UL
RBC # BLD: 4.64 M/UL
RBC # FLD: 16.2 %
WBC # FLD AUTO: 6.69 K/UL

## 2023-01-27 RX ORDER — LEVALBUTEROL TARTRATE 45 UG/1
45 AEROSOL, METERED ORAL
Qty: 1 | Refills: 11 | Status: ACTIVE | COMMUNITY
Start: 2020-03-18 | End: 1900-01-01

## 2023-01-27 RX ORDER — ALBUTEROL SULFATE 90 UG/1
108 (90 BASE) INHALANT RESPIRATORY (INHALATION)
Qty: 1 | Refills: 2 | Status: ACTIVE | COMMUNITY
Start: 2023-01-27 | End: 1900-01-01

## 2023-01-31 LAB
A ALTERNATA IGE QN: <0.1 KUA/L
A FUMIGATUS IGE QN: <0.1 KUA/L
BERMUDA GRASS IGE QN: <0.1 KUA/L
BOXELDER IGE QN: <0.1 KUA/L
C HERBARUM IGE QN: <0.1 KUA/L
CALIF WALNUT IGE QN: <0.1 KUA/L
CAT DANDER IGE QN: <0.1 KUA/L
CMN PIGWEED IGE QN: <0.1 KUA/L
COMMON RAGWEED IGE QN: <0.1 KUA/L
COTTONWOOD IGE QN: <0.1 KUA/L
D FARINAE IGE QN: 0.25 KUA/L
D PTERONYSS IGE QN: 0.15 KUA/L
DEPRECATED A ALTERNATA IGE RAST QL: 0
DEPRECATED A FUMIGATUS IGE RAST QL: 0
DEPRECATED BERMUDA GRASS IGE RAST QL: 0
DEPRECATED BOXELDER IGE RAST QL: 0
DEPRECATED C HERBARUM IGE RAST QL: 0
DEPRECATED CAT DANDER IGE RAST QL: 0
DEPRECATED COMMON PIGWEED IGE RAST QL: 0
DEPRECATED COMMON RAGWEED IGE RAST QL: 0
DEPRECATED COTTONWOOD IGE RAST QL: 0
DEPRECATED D FARINAE IGE RAST QL: NORMAL
DEPRECATED D PTERONYSS IGE RAST QL: NORMAL
DEPRECATED DOG DANDER IGE RAST QL: 0
DEPRECATED GOOSEFOOT IGE RAST QL: 0
DEPRECATED LONDON PLANE IGE RAST QL: 0
DEPRECATED MOUSE URINE PROT IGE RAST QL: 0
DEPRECATED MUGWORT IGE RAST QL: 0
DEPRECATED P NOTATUM IGE RAST QL: 0
DEPRECATED RED CEDAR IGE RAST QL: 0
DEPRECATED ROACH IGE RAST QL: 0
DEPRECATED SHEEP SORREL IGE RAST QL: 0
DEPRECATED SILVER BIRCH IGE RAST QL: 0
DEPRECATED TIMOTHY IGE RAST QL: 0
DEPRECATED WHITE ASH IGE RAST QL: 0
DEPRECATED WHITE OAK IGE RAST QL: 0
DOG DANDER IGE QN: <0.1 KUA/L
GOOSEFOOT IGE QN: <0.1 KUA/L
LONDON PLANE IGE QN: <0.1 KUA/L
MOUSE URINE PROT IGE QN: <0.1 KUA/L
MUGWORT IGE QN: <0.1 KUA/L
MULBERRY (T70) CLASS: 0
MULBERRY (T70) CONC: <0.1 KUA/L
P NOTATUM IGE QN: <0.1 KUA/L
RED CEDAR IGE QN: <0.1 KUA/L
ROACH IGE QN: <0.1 KUA/L
SHEEP SORREL IGE QN: <0.1 KUA/L
SILVER BIRCH IGE QN: <0.1 KUA/L
TIMOTHY IGE QN: <0.1 KUA/L
TOTAL IGE SMQN RAST: 4 KU/L
TREE ALLERG MIX1 IGE QL: 0
WHITE ASH IGE QN: <0.1 KUA/L
WHITE ELM IGE QN: 0
WHITE ELM IGE QN: <0.1 KUA/L
WHITE OAK IGE QN: <0.1 KUA/L

## 2023-03-17 ENCOUNTER — APPOINTMENT (OUTPATIENT)
Dept: GYNECOLOGIC ONCOLOGY | Facility: CLINIC | Age: 63
End: 2023-03-17
Payer: MEDICARE

## 2023-03-17 ENCOUNTER — LABORATORY RESULT (OUTPATIENT)
Age: 63
End: 2023-03-17

## 2023-03-17 VITALS
OXYGEN SATURATION: 98 % | DIASTOLIC BLOOD PRESSURE: 78 MMHG | SYSTOLIC BLOOD PRESSURE: 118 MMHG | WEIGHT: 196 LBS | HEART RATE: 88 BPM | BODY MASS INDEX: 32.65 KG/M2 | HEIGHT: 65 IN

## 2023-03-17 DIAGNOSIS — N87.0 MILD CERVICAL DYSPLASIA: ICD-10-CM

## 2023-03-17 DIAGNOSIS — N89.0 MILD VAGINAL DYSPLASIA: ICD-10-CM

## 2023-03-17 DIAGNOSIS — R87.810 CERVICAL HIGH RISK HUMAN PAPILLOMAVIRUS (HPV) DNA TEST POSITIVE: ICD-10-CM

## 2023-03-17 DIAGNOSIS — R87.612 LOW GRADE SQUAMOUS INTRAEPITHELIAL LESION ON CYTOLOGIC SMEAR OF CERVIX (LGSIL): ICD-10-CM

## 2023-03-17 PROCEDURE — 57455 BIOPSY OF CERVIX W/SCOPE: CPT

## 2023-03-17 PROCEDURE — 99214 OFFICE O/P EST MOD 30 MIN: CPT | Mod: 25

## 2023-03-17 RX ORDER — PANTOPRAZOLE 40 MG/1
40 TABLET, DELAYED RELEASE ORAL
Qty: 180 | Refills: 0 | Status: DISCONTINUED | COMMUNITY
Start: 2021-09-30 | End: 2023-03-17

## 2023-03-20 NOTE — DISCUSSION/SUMMARY
[Discuss Alternatives/Risks/Benefits w/Patient] : All alternatives, risks, and benefits were discussed with the patient/family and all questions were answered.  Patient expressed good understanding and appreciates the importance of follow up as recommended. [Reviewed Clinical Lab Test(s)] : Results of clinical tests were reviewed.

## 2023-03-28 ENCOUNTER — NON-APPOINTMENT (OUTPATIENT)
Age: 63
End: 2023-03-28

## 2023-03-28 LAB — HPV HIGH+LOW RISK DNA PNL CVX: DETECTED

## 2023-04-04 LAB — CORE LAB BIOPSY: NORMAL

## 2023-04-04 NOTE — PROCEDURE
[Colposcopy] : colposcopy [HPV high risk] : PCR positive for high risk HPV [Patient] : the patient [Verbal Consent] : verbal consent was obtained prior to the procedure and is detailed in the patient's record [Biopsies Taken: # ___] : [unfilled] biopsies taken of the cervix [Biopsy Locations ___ o'clock] : the biopsies were taken at the [unfilled] o'clock position [FreeTextEntry9] : Hx ELSY 1/VAIN 1 [FreeTextEntry2] : W [FreeTextEntry3] : Warty changes at 12:00, L posteior lateral vaginal apex with changes [de-identified] : L posterior lateral vaginal apex biopsy. Unsatisfactory colposcopy, inability to visualize transformation zone/dilate cervix with os finder. Multiple attempts made, option to continue with use of lidocaine, 11 blade to enter cervical canal provided to patient. Patient declined at this time due to discomfort.

## 2023-04-04 NOTE — END OF VISIT
[FreeTextEntry3] : Written by Sveta Stern PA-C, acting as a scribe for Dr. Kaity Caba.\par This note accurately reflects the work and decisions made by me.\par

## 2023-04-04 NOTE — CHIEF COMPLAINT
[FreeTextEntry1] : [facility]\par [place] Office\par [address]\[place] [address]\par

## 2023-04-04 NOTE — HISTORY OF PRESENT ILLNESS
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  Patient previously saw Dr Juan Newsome for ELSY 1 on colposcopy, last seen 6/25/21 and discharged to routine GYN care as 1 o clock cervical biopsy 6/15/21 was negative for dysplasia. Since then she has had colposcopy with Dr Figueroa 6/10/22 path as follows cervix 3:00, 2:00 with koilocytic change suggestive of LSIL CIN1, LSIL/CIN1. Cervix and vaginal/posterior fornix biopsy Koilocytic change suggestive of LSIL/VAIN 1, ECC without tissue for evaluation. She had follow up pap smear 1/27/23 with LSIL HPV HR positive (genotype not performed) but has not had colposcopy since that pap smear. \par \par She reports history of LEEP and Efudex therapy\par \par Lmammo: within the [place]\par Lcolonoscopy: within past 5-6 years, unsure of name or recommendations\par LDEXA: 2022 per pt osteopenia and osteoporosis, follows with rheumatology\par \par GYN: Dr Gray Figueroa\par Rheum: Dr Black for sarcoidosis, arthritis\par Pulm: Dr Chad Bob-asthma\par ENT: Dr Morris-GERD\par Neurologist: Dr Michelle Mcdaniel\par Orthopedic: Dr Duke (2022 discectomy)\par Cardiologist: Dr Denae Rhodes- pulmonary stenosis

## 2023-04-27 ENCOUNTER — RX CHANGE (OUTPATIENT)
Age: 63
End: 2023-04-27

## 2023-05-19 ENCOUNTER — APPOINTMENT (OUTPATIENT)
Dept: OTOLARYNGOLOGY | Facility: CLINIC | Age: 63
End: 2023-05-19
Payer: MEDICARE

## 2023-05-19 ENCOUNTER — NON-APPOINTMENT (OUTPATIENT)
Age: 63
End: 2023-05-19

## 2023-05-19 VITALS
HEART RATE: 109 BPM | SYSTOLIC BLOOD PRESSURE: 126 MMHG | HEIGHT: 65 IN | OXYGEN SATURATION: 100 % | WEIGHT: 197 LBS | BODY MASS INDEX: 32.82 KG/M2 | DIASTOLIC BLOOD PRESSURE: 62 MMHG

## 2023-05-19 DIAGNOSIS — H61.21 IMPACTED CERUMEN, RIGHT EAR: ICD-10-CM

## 2023-05-19 PROCEDURE — 31579 LARYNGOSCOPY TELESCOPIC: CPT

## 2023-05-19 PROCEDURE — 99214 OFFICE O/P EST MOD 30 MIN: CPT | Mod: 25

## 2023-05-19 PROCEDURE — 69210 REMOVE IMPACTED EAR WAX UNI: CPT

## 2023-05-19 RX ORDER — SODIUM CHLORIDE FOR INHALATION 0.9 %
0.9 VIAL, NEBULIZER (ML) INHALATION
Qty: 2 | Refills: 4 | Status: COMPLETED | COMMUNITY
Start: 2021-02-18 | End: 2023-05-19

## 2023-05-19 RX ORDER — FLUTICASONE FUROATE AND VILANTEROL TRIFENATATE 200; 25 UG/1; UG/1
200-25 POWDER RESPIRATORY (INHALATION)
Qty: 1 | Refills: 11 | Status: COMPLETED | COMMUNITY
Start: 2021-07-28 | End: 2023-05-19

## 2023-05-19 RX ORDER — FAMOTIDINE 40 MG/1
40 TABLET, FILM COATED ORAL
Qty: 90 | Refills: 3 | Status: ACTIVE | COMMUNITY
Start: 2020-07-29 | End: 1900-01-01

## 2023-05-19 RX ORDER — METHYLPREDNISOLONE 4 MG/1
4 TABLET ORAL
Qty: 1 | Refills: 0 | Status: COMPLETED | COMMUNITY
Start: 2021-06-17 | End: 2023-05-19

## 2023-05-19 RX ORDER — UMECLIDINIUM 62.5 UG/1
62.5 AEROSOL, POWDER ORAL
Qty: 1 | Refills: 11 | Status: COMPLETED | COMMUNITY
Start: 2020-02-11 | End: 2023-05-19

## 2023-05-19 RX ORDER — CREAM BASE NO.31
CREAM (GRAM) MISCELLANEOUS
Qty: 1 | Refills: 0 | Status: COMPLETED | COMMUNITY
Start: 2019-12-16 | End: 2023-05-19

## 2023-05-19 NOTE — CONSULT LETTER
[Dear  ___] : Dear  [unfilled], [Consult Letter:] : I had the pleasure of evaluating your patient, [unfilled]. [Please see my note below.] : Please see my note below. [Consult Closing:] : Thank you very much for allowing me to participate in the care of this patient.  If you have any questions, please do not hesitate to contact me. [Sincerely,] : Sincerely, [FreeTextEntry3] : Davon Morris MD, PhD\par Chief, Division of Laryngology\par Department of Otolaryngology\par [facility]\par Pediatric Otolaryngology, [facility]\par  of Otolaryngology\par [facility] [employer] [facility]\par

## 2023-05-19 NOTE — HISTORY OF PRESENT ILLNESS
[de-identified] : 62yF here for f/u laryngospasm and coughing. States had back surgery 07/26/2021\par States had a few days of dysphagia with solids and liquids last year. \par Reports intermittent "rattle" in chest- will schedule appointment with pulmonologist, occasional hoarseness. \par States continues to take famotidine, discontinued protonix, Flonase, montelukast per cardiologist. \par Denies odynophagia, otalgia.\par States drinks celery juice in the morning. \par States  started a new job working with [employer]- thinks can be a trigger to her asthma. \par Moved back from [facility] 12/2022. \par States completed only 7 days of 10 day course doxycycline 2 weeks ago for asthmatic bronchitis due to nausea. \par Still sleeping with head of bed elevation. \par Patient states for 2 days, wasn't able to eat or drink but resolved. In Dec, patient reports having epistaxis everyday, naturally resolved. \par Patient states chronic back pain is stable but fell down during her stay in the [facility].

## 2023-05-22 ENCOUNTER — APPOINTMENT (OUTPATIENT)
Dept: PULMONOLOGY | Facility: CLINIC | Age: 63
End: 2023-05-22
Payer: MEDICARE

## 2023-05-22 ENCOUNTER — APPOINTMENT (OUTPATIENT)
Dept: RADIOLOGY | Facility: IMAGING CENTER | Age: 63
End: 2023-05-22
Payer: MEDICARE

## 2023-05-22 ENCOUNTER — OUTPATIENT (OUTPATIENT)
Dept: OUTPATIENT SERVICES | Facility: HOSPITAL | Age: 63
LOS: 1 days | End: 2023-05-22
Payer: MEDICARE

## 2023-05-22 VITALS
SYSTOLIC BLOOD PRESSURE: 124 MMHG | OXYGEN SATURATION: 97 % | DIASTOLIC BLOOD PRESSURE: 88 MMHG | BODY MASS INDEX: 32.99 KG/M2 | TEMPERATURE: 97.3 F | HEART RATE: 106 BPM | HEIGHT: 65 IN | WEIGHT: 198 LBS | RESPIRATION RATE: 18 BRPM

## 2023-05-22 DIAGNOSIS — Z98.890 OTHER SPECIFIED POSTPROCEDURAL STATES: Chronic | ICD-10-CM

## 2023-05-22 DIAGNOSIS — R06.00 DYSPNEA, UNSPECIFIED: ICD-10-CM

## 2023-05-22 DIAGNOSIS — Z90.81 ACQUIRED ABSENCE OF SPLEEN: Chronic | ICD-10-CM

## 2023-05-22 DIAGNOSIS — R50.9 FEVER, UNSPECIFIED: ICD-10-CM

## 2023-05-22 DIAGNOSIS — Z90.89 ACQUIRED ABSENCE OF OTHER ORGANS: Chronic | ICD-10-CM

## 2023-05-22 PROCEDURE — 71046 X-RAY EXAM CHEST 2 VIEWS: CPT

## 2023-05-22 PROCEDURE — 71046 X-RAY EXAM CHEST 2 VIEWS: CPT | Mod: 26

## 2023-05-22 PROCEDURE — 99214 OFFICE O/P EST MOD 30 MIN: CPT

## 2023-05-23 LAB
HPIV3 RNA SPEC QL NAA+PROBE: DETECTED
RAPID RVP RESULT: DETECTED
SARS-COV-2 RNA PNL RESP NAA+PROBE: NOT DETECTED

## 2023-05-23 NOTE — ADDENDUM
[FreeTextEntry1] : Pt with Parainfluenza on RVP 5/22, CXR no PNA. Reviewed with pt- with worsening cough/ congestion. Start Augmentin x 7 days and levalbuterol nebulizers prn.\par \par Advised ED eval if symptoms worsen\par

## 2023-05-23 NOTE — ASSESSMENT
[FreeTextEntry1] : Asthma: with current exacerbation. Currently with symptoms of URI, low grade fever, chills, myalgias and wheezing.Will check RVP r/o viral infection and CXR r/o PNA. Start prednisone 40 mg  daily x 5 days. Will call pt with results of testing\par \par Reviewed s/s worsening symptoms to call office\par \par RTC in 1 week or earlier\par

## 2023-05-23 NOTE — HISTORY OF PRESENT ILLNESS
Per PST phone interview with patient's Mother/Father , patient's parents are legal guardians and will be present day of surgery.      [Never] : never [TextBox_4] : 62 y.o female PMH Eosinophilic Asthma, GERD, Laryngyospasm here for sick visit. She c/o worsening control of her asthma last Wednesday triggered by construction dust her  brought home from work where she needed her rescue inhaler 1-2 times. On Saturday, she developed low grade fever 99F , + runny dose, nasal congestion, chills and  myalgias, with  wheeze/ barking productive cough scant yellow phlegm.  Of note, she recently returned to work as a  and was exposed to her coworker on Friday with a viral illness. At baseline, her asthma  asthma has been maintained on on Trelegt  and Fasenra.  She was prescribed prednisone and doxycycline for an asthma exacerbation by her PCP 2 weeks ago/\par \par Social:\par Never smoker

## 2023-05-24 ENCOUNTER — NON-APPOINTMENT (OUTPATIENT)
Age: 63
End: 2023-05-24

## 2023-05-26 ENCOUNTER — NON-APPOINTMENT (OUTPATIENT)
Age: 63
End: 2023-05-26

## 2023-05-30 ENCOUNTER — APPOINTMENT (OUTPATIENT)
Dept: PULMONOLOGY | Facility: CLINIC | Age: 63
End: 2023-05-30
Payer: MEDICARE

## 2023-05-30 VITALS
BODY MASS INDEX: 33.32 KG/M2 | SYSTOLIC BLOOD PRESSURE: 131 MMHG | OXYGEN SATURATION: 97 % | WEIGHT: 200 LBS | HEART RATE: 90 BPM | RESPIRATION RATE: 15 BRPM | TEMPERATURE: 97.1 F | DIASTOLIC BLOOD PRESSURE: 77 MMHG | HEIGHT: 65 IN

## 2023-05-30 PROCEDURE — 99214 OFFICE O/P EST MOD 30 MIN: CPT

## 2023-05-30 NOTE — HISTORY OF PRESENT ILLNESS
[Never] : never [TextBox_4] : 62 y.o female PMH Eosinophilic Asthma, GERD, Laryngyospasm here for f/u asthma. Seen last week and diagnosed with parainfluenza virus. She was prescribed prednisone and augmentin for asthma exacerbation and persistent sinus complaints/ cough. She saw PCP on Thursday and extended her prednisone 40 mg x 5 days, and  started prednisone 20 mg today   x 3 days and on nebulizers bid. . Currently still with c/o cough/ chest tightness\par \par She has been on Fasenra since approx [id number]- multiple exacerbations since. \par \par Social:\par Never smoker

## 2023-05-30 NOTE — ASSESSMENT
[FreeTextEntry1] : Asthma: poorly controlled on Fasenra/ Trelegy.  Recent URI s/p abx and prednisone . COn taper at 20 mg x 3 days and then 10 mg x 3 days.  d/c Fasenra and start Tezipire\par \par RTC in 1 month\par \par CAESAR, Karyn Martin NP, am scribing for and in the presence of Dr. Chad Bob, the following sections HISTORY OF PRESENT ILLNESS, PAST MEDICAL/FAMILY/SOCIAL HISTORY; REVIEW OF SYSTEMS; VITAL SIGNS; PHYSICAL EXAM; DISPOSITION.\par \par \par \par

## 2023-05-30 NOTE — PHYSICAL EXAM
[No Acute Distress] : no acute distress [Normal Rate/Rhythm] : normal rate/rhythm [Normal S1, S2] : normal s1, s2 [No Resp Distress] : no resp distress [No Edema] : no edema [No Focal Deficits] : no focal deficits [Oriented x3] : oriented x3 [TextBox_68] : wheeze

## 2023-06-01 ENCOUNTER — NON-APPOINTMENT (OUTPATIENT)
Age: 63
End: 2023-06-01

## 2023-06-04 ENCOUNTER — EMERGENCY (EMERGENCY)
Facility: HOSPITAL | Age: 63
LOS: 1 days | Discharge: HOME | End: 2023-06-04
Attending: STUDENT IN AN ORGANIZED HEALTH CARE EDUCATION/TRAINING PROGRAM
Payer: MEDICARE

## 2023-06-04 VITALS
SYSTOLIC BLOOD PRESSURE: 142 MMHG | RESPIRATION RATE: 24 BRPM | TEMPERATURE: 99 F | HEIGHT: 66 IN | WEIGHT: 203.05 LBS | OXYGEN SATURATION: 99 % | HEART RATE: 133 BPM | DIASTOLIC BLOOD PRESSURE: 92 MMHG

## 2023-06-04 VITALS — HEART RATE: 97 BPM | OXYGEN SATURATION: 98 % | SYSTOLIC BLOOD PRESSURE: 130 MMHG | DIASTOLIC BLOOD PRESSURE: 85 MMHG

## 2023-06-04 DIAGNOSIS — Z90.89 ACQUIRED ABSENCE OF OTHER ORGANS: Chronic | ICD-10-CM

## 2023-06-04 DIAGNOSIS — Z98.890 OTHER SPECIFIED POSTPROCEDURAL STATES: Chronic | ICD-10-CM

## 2023-06-04 DIAGNOSIS — Z90.81 ACQUIRED ABSENCE OF SPLEEN: Chronic | ICD-10-CM

## 2023-06-04 LAB
ALBUMIN SERPL ELPH-MCNC: 3.7 G/DL — SIGNIFICANT CHANGE UP (ref 3.3–5.2)
ALP SERPL-CCNC: 89 U/L — SIGNIFICANT CHANGE UP (ref 40–120)
ALT FLD-CCNC: 18 U/L — SIGNIFICANT CHANGE UP
ANION GAP SERPL CALC-SCNC: 12 MMOL/L — SIGNIFICANT CHANGE UP (ref 5–17)
APPEARANCE UR: CLEAR — SIGNIFICANT CHANGE UP
APTT BLD: 28.8 SEC — SIGNIFICANT CHANGE UP (ref 27.5–35.5)
AST SERPL-CCNC: 31 U/L — SIGNIFICANT CHANGE UP
BASOPHILS # BLD AUTO: 0.01 K/UL — SIGNIFICANT CHANGE UP (ref 0–0.2)
BASOPHILS NFR BLD AUTO: 0.1 % — SIGNIFICANT CHANGE UP (ref 0–2)
BILIRUB SERPL-MCNC: 0.2 MG/DL — LOW (ref 0.4–2)
BILIRUB UR-MCNC: NEGATIVE — SIGNIFICANT CHANGE UP
BUN SERPL-MCNC: 19.1 MG/DL — SIGNIFICANT CHANGE UP (ref 8–20)
CALCIUM SERPL-MCNC: 8.6 MG/DL — SIGNIFICANT CHANGE UP (ref 8.4–10.5)
CHLORIDE SERPL-SCNC: 106 MMOL/L — SIGNIFICANT CHANGE UP (ref 96–108)
CO2 SERPL-SCNC: 23 MMOL/L — SIGNIFICANT CHANGE UP (ref 22–29)
COLOR SPEC: YELLOW — SIGNIFICANT CHANGE UP
CREAT SERPL-MCNC: 0.71 MG/DL — SIGNIFICANT CHANGE UP (ref 0.5–1.3)
DIFF PNL FLD: NEGATIVE — SIGNIFICANT CHANGE UP
EGFR: 97 ML/MIN/1.73M2 — SIGNIFICANT CHANGE UP
EOSINOPHIL # BLD AUTO: 0 K/UL — SIGNIFICANT CHANGE UP (ref 0–0.5)
EOSINOPHIL NFR BLD AUTO: 0 % — SIGNIFICANT CHANGE UP (ref 0–6)
GAS PNL BLDV: SIGNIFICANT CHANGE UP
GLUCOSE SERPL-MCNC: 126 MG/DL — HIGH (ref 70–99)
GLUCOSE UR QL: NEGATIVE MG/DL — SIGNIFICANT CHANGE UP
HCT VFR BLD CALC: 39.3 % — SIGNIFICANT CHANGE UP (ref 34.5–45)
HGB BLD-MCNC: 12.9 G/DL — SIGNIFICANT CHANGE UP (ref 11.5–15.5)
HIV 1 & 2 AB SERPL IA.RAPID: SIGNIFICANT CHANGE UP
IMM GRANULOCYTES NFR BLD AUTO: 0.9 % — SIGNIFICANT CHANGE UP (ref 0–0.9)
INR BLD: 0.97 RATIO — SIGNIFICANT CHANGE UP (ref 0.88–1.16)
KETONES UR-MCNC: NEGATIVE — SIGNIFICANT CHANGE UP
LEUKOCYTE ESTERASE UR-ACNC: ABNORMAL
LYMPHOCYTES # BLD AUTO: 1.19 K/UL — SIGNIFICANT CHANGE UP (ref 1–3.3)
LYMPHOCYTES # BLD AUTO: 8.6 % — LOW (ref 13–44)
MCHC RBC-ENTMCNC: 31.5 PG — SIGNIFICANT CHANGE UP (ref 27–34)
MCHC RBC-ENTMCNC: 32.8 GM/DL — SIGNIFICANT CHANGE UP (ref 32–36)
MCV RBC AUTO: 96.1 FL — SIGNIFICANT CHANGE UP (ref 80–100)
MONOCYTES # BLD AUTO: 0.47 K/UL — SIGNIFICANT CHANGE UP (ref 0–0.9)
MONOCYTES NFR BLD AUTO: 3.4 % — SIGNIFICANT CHANGE UP (ref 2–14)
NEUTROPHILS # BLD AUTO: 12.06 K/UL — HIGH (ref 1.8–7.4)
NEUTROPHILS NFR BLD AUTO: 87 % — HIGH (ref 43–77)
NITRITE UR-MCNC: NEGATIVE — SIGNIFICANT CHANGE UP
PH UR: 6 — SIGNIFICANT CHANGE UP (ref 5–8)
PLATELET # BLD AUTO: 279 K/UL — SIGNIFICANT CHANGE UP (ref 150–400)
POTASSIUM SERPL-MCNC: 5.7 MMOL/L — HIGH (ref 3.5–5.3)
POTASSIUM SERPL-SCNC: 5.7 MMOL/L — HIGH (ref 3.5–5.3)
PROT SERPL-MCNC: 6.6 G/DL — SIGNIFICANT CHANGE UP (ref 6.6–8.7)
PROT UR-MCNC: NEGATIVE — SIGNIFICANT CHANGE UP
PROTHROM AB SERPL-ACNC: 11.2 SEC — SIGNIFICANT CHANGE UP (ref 10.5–13.4)
RBC # BLD: 4.09 M/UL — SIGNIFICANT CHANGE UP (ref 3.8–5.2)
RBC # FLD: 15.8 % — HIGH (ref 10.3–14.5)
SODIUM SERPL-SCNC: 141 MMOL/L — SIGNIFICANT CHANGE UP (ref 135–145)
SP GR SPEC: 1.01 — SIGNIFICANT CHANGE UP (ref 1.01–1.02)
UROBILINOGEN FLD QL: NEGATIVE MG/DL — SIGNIFICANT CHANGE UP
WBC # BLD: 13.86 K/UL — HIGH (ref 3.8–10.5)
WBC # FLD AUTO: 13.86 K/UL — HIGH (ref 3.8–10.5)

## 2023-06-04 PROCEDURE — 99285 EMERGENCY DEPT VISIT HI MDM: CPT

## 2023-06-04 PROCEDURE — 71045 X-RAY EXAM CHEST 1 VIEW: CPT | Mod: 26

## 2023-06-04 PROCEDURE — 73564 X-RAY EXAM KNEE 4 OR MORE: CPT | Mod: 26,RT

## 2023-06-04 PROCEDURE — 93010 ELECTROCARDIOGRAM REPORT: CPT

## 2023-06-04 PROCEDURE — 93971 EXTREMITY STUDY: CPT | Mod: 26,RT

## 2023-06-04 RX ORDER — SODIUM CHLORIDE 9 MG/ML
1770 INJECTION, SOLUTION INTRAVENOUS ONCE
Refills: 0 | Status: COMPLETED | OUTPATIENT
Start: 2023-06-04 | End: 2023-06-04

## 2023-06-04 RX ORDER — HYDROMORPHONE HYDROCHLORIDE 2 MG/ML
0.5 INJECTION INTRAMUSCULAR; INTRAVENOUS; SUBCUTANEOUS ONCE
Refills: 0 | Status: DISCONTINUED | OUTPATIENT
Start: 2023-06-04 | End: 2023-06-04

## 2023-06-04 RX ORDER — KETOROLAC TROMETHAMINE 30 MG/ML
30 SYRINGE (ML) INJECTION ONCE
Refills: 0 | Status: DISCONTINUED | OUTPATIENT
Start: 2023-06-04 | End: 2023-06-04

## 2023-06-04 RX ADMIN — Medication 30 MILLIGRAM(S): at 21:03

## 2023-06-04 RX ADMIN — SODIUM CHLORIDE 1770 MILLILITER(S): 9 INJECTION, SOLUTION INTRAVENOUS at 21:03

## 2023-06-04 RX ADMIN — HYDROMORPHONE HYDROCHLORIDE 0.5 MILLIGRAM(S): 2 INJECTION INTRAMUSCULAR; INTRAVENOUS; SUBCUTANEOUS at 22:49

## 2023-06-04 NOTE — ED ADULT NURSE NOTE - CHIEF COMPLAINT QUOTE
patient reports right knee pain after dancing last night, coughing with bila exp wheeze, has had parainfluenza for 3 weeks, cough, exertional shortness of breath in triage, ekg being performed, hx of sarcoidosis with pulmonary stenosis

## 2023-06-04 NOTE — ED ADULT TRIAGE NOTE - CHIEF COMPLAINT QUOTE
patient reports right knee pain after dancing last night, coughing with bila exp wheeze, has had parainfluenza for 3 weeks, cough, exertional shortness of breath in triage, ekg being performed patient reports right knee pain after dancing last night, coughing with bila exp wheeze, has had parainfluenza for 3 weeks, cough, exertional shortness of breath in triage, ekg being performed, hx of sarcoidosis with pulmonary stenosis

## 2023-06-04 NOTE — ED PROVIDER NOTE - PHYSICAL EXAMINATION
General: Well appearing female in no acute distress  HEENT: Normocephalic, atraumatic. Moist mucous membranes. Oropharynx clear. No lymphadenopathy.  Eyes: No scleral icterus. EOMI. RUSTY.  Neck:. Soft and supple. Full ROM without pain. No midline tenderness  Cardiac: Regular rate and regular rhythm. No murmurs, rubs, gallops. Peripheral pulses 2+ and symmetric. No LE edema.  Resp: Lungs CTAB. Speaking in full sentences. No wheezes, rales or rhonchi.  Abd: Soft, non-tender, non-distended. No guarding or rebound. No scars, masses, or lesions.  Back: Spine midline and non-tender. No CVA tenderness.    Skin: No rashes, abrasions, or lacerations.  Neuro: AO x 3. Moves all extremities symmetrically. Motor strength and sensation grossly intact. able to passively range the R knee relative to the L but less compared to the R. able to actively range the R knee. not hot to touch, possible small effusion R lateral knee but non-tender.

## 2023-06-04 NOTE — ED PROVIDER NOTE - CARE PROVIDER_API CALL
Davis Dickson  Orthopaedic Surgery  [address] [place] [id number]  Phone: [phone]  Fax: [phone]  Established Patient  Follow Up Time: Routine

## 2023-06-04 NOTE — ED ADULT NURSE NOTE - OBJECTIVE STATEMENT
Assumed care of pt at 2030 Pt A&Ox4 c/o having croup for past 3 weeks and has been coughing and having bilateral audible wheezes. Pt endorses PMHx of asthma. Pt was feeling better yesterday and went dancing, now has significant knee pain to right knee. Resp are even and unlabored, abd soft nontender, denies n/v/d, denies CP, but feels intermittent SOB and SANDERSON. Pt states last taking tylenol this afternoon at 12pm. Pt resting on the stretcher with no other complaints.

## 2023-06-04 NOTE — ED PROVIDER NOTE - ATTENDING CONTRIBUTION TO CARE
63 yo uncomfortable appearing female presents for evaluation/management of persistent right knee pain s/p going to dinner and dancing at her sister's detention party yesterday evening.  Gen: Alert, mild-moderate painful distress  Head: NC, AT, EOMI, normal lids/conjunctiva  ENT: normal hearing, patent oropharynx without erythema/exudate, uvula midline  Neck: +supple, no tenderness/meningismus/JVD, +Trachea midline  Pulm: Bilateral BS, normal resp effort, no wheeze/stridor/retractions  CV: RRR, no R/G, +dist pulses  Abd: soft, NT/ND, +BS  Mskel: Focal quater-sized area of swelling noted to the distal right lateral upper leg with significant tenderness and faint ecchymosis. Also mild tenderness to posterior knee. Patient with mild limitation of ROM but knee is not swollen, erythematous, hot, or obvious deformity. No edema/erythema/cyanosis  Skin: no rash  Neuro: AAOx3, no gross sensory/motor deficits, CN 2-12 intact  I personally saw the patient with the resident, and completed the key components of the history and physical exam. I then discussed the management plan with the resident.

## 2023-06-04 NOTE — ED ADULT NURSE NOTE - NSFALLUNIVINTERV_ED_ALL_ED
No
Bed/Stretcher in lowest position, wheels locked, appropriate side rails in place/Call bell, personal items and telephone in reach/Instruct patient to call for assistance before getting out of bed/chair/stretcher/Non-slip footwear applied when patient is off stretcher/[place] to call system/Physically safe environment - no spills, clutter or unnecessary equipment/Purposeful proactive rounding/Room/bathroom lighting operational, light cord in reach

## 2023-06-04 NOTE — ED PROVIDER NOTE - PATIENT PORTAL LINK FT
You can access the FollowMyHealth Patient Portal offered by [facility] by registering at the following website: [email or web address]. By joining [employer]’s FollowMyHealth portal, you will also be able to view your health information using other applications (apps) compatible with our system.

## 2023-06-04 NOTE — ED PROVIDER NOTE - NSFOLLOWUPINSTRUCTIONS_ED_ALL_ED_FT
Followup with orthopedic doctor in the next 1-14 days.    Return to emergency department if symptoms worsen, unable to ambulate, worsening pain, fever or chills, changes in sensation to the lower extremities, chest pain, shortness of breath. Acute Knee Pain, Adult    Acute knee pain is sudden and may be caused by damage, swelling, or irritation of the muscles and tissues that support your knee. The injury may result from:    A fall.  An injury to your knee from twisting motions.  A hit to the knee.  Infection.    Acute knee pain may go away on its own with time and rest. If it does not, your health care provider may order tests to find the cause of the pain. These may include:    Imaging tests, such as an X-ray, MRI, or ultrasound.  Joint aspiration. In this test, fluid is removed from the knee.  Arthroscopy. In this test, a lighted tube is inserted into the knee and an image is projected onto a TV screen.  Biopsy. In this test, a sample of tissue is removed from the body and studied under a microscope.    Follow these instructions at home:  Pay attention to any changes in your symptoms. Take these actions to relieve your pain.        If you have a knee sleeve or brace:     Wear the sleeve or brace as told by your health care provider. Remove it only as told by your health care provider.  Loosen the sleeve or brace if your toes tingle, become numb, or turn cold and blue.  Keep the sleeve or brace clean.  If the sleeve or brace is not waterproof:    Do not let it get wet.  Cover it with a watertight covering when you take a bath or shower.        Activity    Rest your knee.  Do not do things that cause pain or make pain worse.  Avoid high-impact activities or exercises, such as running, jumping rope, or doing jumping jacks.  Work with a physical therapist to make a safe exercise program, as recommended by your health care provider. Do exercises as told by your physical therapist.        Managing pain, stiffness, and swelling     If directed, put ice on the knee:    Put ice in a plastic bag.  Place a towel between your skin and the bag.  Leave the ice on for 20 minutes, 2–3 times a day.  If directed, use an elastic bandage to put pressure (compression) on your injured knee. This may control swelling, give support, and help with discomfort.        General instructions    Take over-the-counter and prescription medicines only as told by your health care provider.  Raise (elevate) your knee above the level of your heart when you are sitting or lying down.  Sleep with a pillow under your knee.  Do not use any products that contain nicotine or tobacco, such as cigarettes, e-cigarettes, and chewing tobacco. These can delay healing. If you need help quitting, ask your health care provider.  If you are overweight, work with your health care provider and a dietitian to set a weight-loss goal that is healthy and reasonable for you. Extra weight can put pressure on your knee.  Keep all follow-up visits as told by your health care provider. This is important.    Contact a health care provider if:  Your knee pain continues, changes, or gets worse.  You have a fever along with knee pain.  Your knee feels warm to the touch.  Your knee zia or locks up.    Get help right away if:  Your knee swells, and the swelling becomes worse.  You cannot move your knee.  You have severe pain in your knee.    Summary  Acute knee pain can be caused by a fall, an injury, an infection, or damage, swelling, or irritation of the tissues that support your knee.  Your health care provider may perform tests to find out the cause of the pain.  Pay attention to any changes in your symptoms. Relieve your pain with rest, medicines, light activity, and use of ice.  Get help if your pain continues or becomes worse, your knee swells, or you cannot move your knee.    ADDITIONAL NOTES AND INSTRUCTIONS    Please follow up with your Primary MD in 24-48 hr.  Seek immediate medical care for any new/worsening signs or symptoms. Followup with orthopedic doctor in next 14 days. Take one tablet oxycodone every 8 hours for severe pain ONLY AS NEEDED, do NOT take prior to driving a vehicle as instructed.     Acute Knee Pain, Adult    Acute knee pain is sudden and may be caused by damage, swelling, or irritation of the muscles and tissues that support your knee. The injury may result from:    A fall.  An injury to your knee from twisting motions.  A hit to the knee.  Infection.    Acute knee pain may go away on its own with time and rest. If it does not, your health care provider may order tests to find the cause of the pain. These may include:    Imaging tests, such as an X-ray, MRI, or ultrasound.  Joint aspiration. In this test, fluid is removed from the knee.  Arthroscopy. In this test, a lighted tube is inserted into the knee and an image is projected onto a TV screen.  Biopsy. In this test, a sample of tissue is removed from the body and studied under a microscope.    Follow these instructions at home:  Pay attention to any changes in your symptoms. Take these actions to relieve your pain.        If you have a knee sleeve or brace:     Wear the sleeve or brace as told by your health care provider. Remove it only as told by your health care provider.  Loosen the sleeve or brace if your toes tingle, become numb, or turn cold and blue.  Keep the sleeve or brace clean.  If the sleeve or brace is not waterproof:    Do not let it get wet.  Cover it with a watertight covering when you take a bath or shower.        Activity    Rest your knee.  Do not do things that cause pain or make pain worse.  Avoid high-impact activities or exercises, such as running, jumping rope, or doing jumping jacks.  Work with a physical therapist to make a safe exercise program, as recommended by your health care provider. Do exercises as told by your physical therapist.        Managing pain, stiffness, and swelling     If directed, put ice on the knee:    Put ice in a plastic bag.  Place a towel between your skin and the bag.  Leave the ice on for 20 minutes, 2–3 times a day.  If directed, use an elastic bandage to put pressure (compression) on your injured knee. This may control swelling, give support, and help with discomfort.        General instructions    Take over-the-counter and prescription medicines only as told by your health care provider.  Raise (elevate) your knee above the level of your heart when you are sitting or lying down.  Sleep with a pillow under your knee.  Do not use any products that contain nicotine or tobacco, such as cigarettes, e-cigarettes, and chewing tobacco. These can delay healing. If you need help quitting, ask your health care provider.  If you are overweight, work with your health care provider and a dietitian to set a weight-loss goal that is healthy and reasonable for you. Extra weight can put pressure on your knee.  Keep all follow-up visits as told by your health care provider. This is important.    Contact a health care provider if:  Your knee pain continues, changes, or gets worse.  You have a fever along with knee pain.  Your knee feels warm to the touch.  Your knee zia or locks up.    Get help right away if:  Your knee swells, and the swelling becomes worse.  You cannot move your knee.  You have severe pain in your knee.    Summary  Acute knee pain can be caused by a fall, an injury, an infection, or damage, swelling, or irritation of the tissues that support your knee.  Your health care provider may perform tests to find out the cause of the pain.  Pay attention to any changes in your symptoms. Relieve your pain with rest, medicines, light activity, and use of ice.  Get help if your pain continues or becomes worse, your knee swells, or you cannot move your knee.    ADDITIONAL NOTES AND INSTRUCTIONS    Please follow up with your Primary MD in 24-48 hr.  Seek immediate medical care for any new/worsening signs or symptoms.

## 2023-06-04 NOTE — ED PROVIDER NOTE - PROGRESS NOTE DETAILS
HR improved s/p fluid bolus, well appearing. Patient with complaints of pain radiating to the back of the upper and lower legs but non-tender in these regions.

## 2023-06-04 NOTE — ED PROVIDER NOTE - OBJECTIVE STATEMENT
61 y/o F hx of sarcoidosis, splenectomy presents w/ R knee pain for past 1 day. states she was dancing yesterday when she developed R knee pain that gradually got worse throughout the night and worse today. able to bear weight but pain is worse w/ ambulation. took tylenol /w minimal relief. endorsing being diagnosed w/ parainfluenza 3 weeks ago and finished augmentin a few days ago. currently on a prednisone taper and down to 10mg currently. endorsing cough. took tylenol at aprox 12 PM today. also is finishing course of macrobid for UTI, has no dysuria or other symptoms. denies falls. denies abdominal pain. denies chest pain/sob.

## 2023-06-04 NOTE — ED PROVIDER NOTE - CLINICAL SUMMARY MEDICAL DECISION MAKING FREE TEXT BOX
63 y/o F hx of sarcoidosis, splenectomy presents w/ R knee pain for past 1 day. states she was dancing yesterday when she developed R knee pain that gradually got worse throughout the night and worse today.   patient able to passively range R knee, limited relative to the L knee. not hot to touch, possible mild effusion over R lateral knee, atraumatic, did not fall. able to actively range the R knee.   will get xray. sepsis labs. tachycardic on arrival, 99.9 rectal. pain control, fluid bolus and reassess. hold off on abx given known parainfluenza virus diagnosis. 63 y/o F hx of sarcoidosis, splenectomy presents w/ R knee pain for past 1 day. states she was dancing yesterday when she developed R knee pain that gradually got worse throughout the night and worse today.   patient able to passively range R knee, limited relative to the L knee. not hot to touch, possible mild effusion over R lateral knee, atraumatic, did not fall. able to actively range the R knee.   will get xray. sepsis labs. tachycardic on arrival, 99.9 rectal. pain control, fluid bolus and reassess. hold off on abx given known parainfluenza virus diagnosis.    knee immobilizer placed over R knee w/ cane given. f/u w/ ortho discussed. imaging results discussed.   Conversation had with patient regarding results of testing. Patient agrees with plan for discharge at this time. Patient agrees to comply with follow up with PCP. Return to ED precautions and discharge instructions given to patient.

## 2023-06-05 LAB
CULTURE RESULTS: SIGNIFICANT CHANGE UP
LACTATE SERPL-SCNC: 1.4 MMOL/L — SIGNIFICANT CHANGE UP (ref 0.5–2)
RAPID RVP RESULT: SIGNIFICANT CHANGE UP
SARS-COV-2 RNA SPEC QL NAA+PROBE: SIGNIFICANT CHANGE UP
SPECIMEN SOURCE: SIGNIFICANT CHANGE UP

## 2023-06-05 RX ORDER — KETOROLAC TROMETHAMINE 30 MG/ML
15 SYRINGE (ML) INJECTION ONCE
Refills: 0 | Status: DISCONTINUED | OUTPATIENT
Start: 2023-06-05 | End: 2023-06-05

## 2023-06-05 RX ORDER — OXYCODONE HYDROCHLORIDE 5 MG/1
0.5 TABLET ORAL
Qty: 6 | Refills: 0
Start: 2023-06-05 | End: 2023-06-07

## 2023-06-05 RX ORDER — OXYCODONE HYDROCHLORIDE 5 MG/1
1 TABLET ORAL
Qty: 6 | Refills: 0
Start: 2023-06-05 | End: 2023-06-06

## 2023-06-05 RX ADMIN — Medication 15 MILLIGRAM(S): at 02:27

## 2023-06-05 NOTE — ED POST DISCHARGE NOTE - REASON FOR FOLLOW-UP
pharmacy does not carry oxycodone 5mg due to shortage. 5mg rx was canceled and new script sent Other

## 2023-06-10 LAB
CULTURE RESULTS: SIGNIFICANT CHANGE UP
CULTURE RESULTS: SIGNIFICANT CHANGE UP
SPECIMEN SOURCE: SIGNIFICANT CHANGE UP
SPECIMEN SOURCE: SIGNIFICANT CHANGE UP

## 2023-06-14 ENCOUNTER — NON-APPOINTMENT (OUTPATIENT)
Age: 63
End: 2023-06-14

## 2023-06-14 RX ORDER — EPINEPHRINE 0.3 MG/.3ML
0.3 INJECTION INTRAMUSCULAR
Qty: 1 | Refills: 0 | Status: ACTIVE | COMMUNITY
Start: 2023-06-14 | End: 1900-01-01

## 2023-06-15 ENCOUNTER — RX CHANGE (OUTPATIENT)
Age: 63
End: 2023-06-15

## 2023-06-15 ENCOUNTER — APPOINTMENT (OUTPATIENT)
Dept: ORTHOPEDIC SURGERY | Facility: CLINIC | Age: 63
End: 2023-06-15
Payer: MEDICARE

## 2023-06-15 VITALS — HEIGHT: 65 IN | BODY MASS INDEX: 33.32 KG/M2 | WEIGHT: 200 LBS

## 2023-06-15 DIAGNOSIS — M25.561 PAIN IN RIGHT KNEE: ICD-10-CM

## 2023-06-15 PROCEDURE — 20610 DRAIN/INJ JOINT/BURSA W/O US: CPT | Mod: RT

## 2023-06-15 PROCEDURE — 73564 X-RAY EXAM KNEE 4 OR MORE: CPT | Mod: RT

## 2023-06-15 PROCEDURE — 99214 OFFICE O/P EST MOD 30 MIN: CPT | Mod: 25

## 2023-06-15 RX ORDER — LEVALBUTEROL HYDROCHLORIDE 1.25 MG/3ML
1.25 SOLUTION RESPIRATORY (INHALATION)
Qty: 180 | Refills: 3 | Status: ACTIVE | COMMUNITY
Start: 2023-06-15 | End: 1900-01-01

## 2023-06-15 RX ORDER — LEVALBUTEROL HYDROCHLORIDE 1.25 MG/3ML
1.25 SOLUTION RESPIRATORY (INHALATION)
Qty: 180 | Refills: 3 | Status: DISCONTINUED | COMMUNITY
Start: 2023-05-23 | End: 2023-06-15

## 2023-06-15 NOTE — ADDENDUM
[FreeTextEntry1] : This note was written by Frida Naidu, acting as the  for Dr. Lainez. This note accurately reflects the work and decisions made by Dr. Lainez.

## 2023-06-15 NOTE — PHYSICAL EXAM
[Antalgic] : antalgic [Cane] : ambulates with cane [de-identified] : GENERAL APPEARANCE: Well nourished and hydrated, pleasant, alert, and oriented x 3. Appears their stated age. \par HEENT: Normocephalic, extraocular eye motion intact. Nasal septum midline. Oral cavity clear. External auditory canal clear. \par RESPIRATORY: Breath sounds clear and audible in all lobes. No wheezing, No accessory muscle use.\par CARDIOVASCULAR: No apparent abnormalities. No lower leg edema. No varicosities. Pedal pulses are palpable.\par NEUROLOGIC: Sensation is normal, no muscle weakness in the upper or lower extremities.\par DERMATOLOGIC: No apparent skin lesions, moist, warm, no rash.\par SPINE: Cervical spine appears normal and moves freely; thoracic spine appears normal and moves freely; lumbosacral spine appears normal and moves freely, normal, nontender.\par MUSCULOSKELETAL: Hands, wrists, and elbows are normal and move freely, shoulders are normal and move freely. \par PSYCHIATRIC: Oriented to person, place, and time, insight and judgement were intact and the affect was normal. [de-identified] : Right knee exam shows Mild effusion, ROM is 0-100 degrees (pain with terminal flexinon, No gross instability, 2A Lachman's testing, No pain with [place], Posteromedial joint line, popliteal fossa tenderness. Positive patellofemoral grind  \par 5/5 motor strength in bilateral lower extremities. Sensory: Intact in bilateral lower extremities. DTRs: Biceps, brachioradialis, triceps, patellar, ankle and plantar 2+ and symmetric bilaterally. Pulses: dorsalis pedis, posterior tibial, femoral, popliteal, and radial 2+ and symmetric bilaterally. [de-identified] : X rays were taken of the right knee AP, LATERAL, SUNRISE & TUNNEL VIEWS.           \par No fracture, dislocation or loose body.               \par There is evidence of grade 1 arthritic change in the medial compartment, grade 2 in the patellofemoral compartment as per Kellgren-Fritz guidelines.

## 2023-06-15 NOTE — PROCEDURE
[de-identified] : I injected his right knee.\par I discussed at length with the patient the planned steroid and lidocaine injection. The risks, benefits, convalescence and alternatives were reviewed. The possible side effects discussed included but were not limited to: pain, swelling, heat, bleeding, and redness. Symptoms are generally mild but if they are extensive then contact the office. Giving pain relievers by mouth such as NSAIDs or Tylenol can generally treat the reactions to steroid and lidocaine. Rare cases of infection have been noted. Rash, hives and itching may occur post injection. If you have muscle pain or cramps, flushing and or swelling of the face, rapid heart beat, nausea, dizziness, fever, chills, headache, difficulty breathing, swelling in the arms or legs, or have a prickly feeling of your skin, contact a health care provider immediately. Following this discussion, the knee was prepped with Alcohol and under sterile condition the 80 mg Depo-Medrol and 6 cc Lidocaine injection was performed with a 20 gauge needle through a superolateral injection site. The needle was introduced into the joint, aspiration was performed to ensure intra-articular placement and the medication was injected. Upon withdrawal of the needle the site was cleaned with alcohol and a band aid applied. The patient tolerated the injection well and there were no adverse effects. Post injection instructions included no strenuous activity for 24 hours, cryotherapy and if there are any adverse effects to contact the office.\par

## 2023-06-15 NOTE — HISTORY OF PRESENT ILLNESS
[Pain Location] : pain [] : right knee [de-identified] : 62 year old female patient presents today for an initial consultation for right knee pain. \par She states that her symptoms began on 6/3/2023.  She denies any specific moment of injury, but states her symptoms began while dancing.\par She does present today with complaints of constant, waxing and waning, diffuse dull aching pain greatest over the anterior and posterior aspects of the knee, secondarily to the medial joint line. \par Associated with mild swelling, generalized stiffness with range of motion.\par She denies any catching, locking or buckling.\par No radiating pain or paresthesia.\par Of note, she did suffer a fall/twisting injury to the knee back in August 2022.\par She has noted some intermittent discomfort to the medial joint line since, but states that her symptoms are relatively mild until her most recent fall.\par She was recently seen in the emergency department.  She states that x-rays taken were negative for fracture.  She did also have a Doppler ultrasound which revealed the presence of a Baker's cyst.\par She has been taking over-the-counter anti-inflammatories with mild improvement of symptoms.  She has been weightbearing with the use of a knee immobilizer and cane.

## 2023-06-15 NOTE — DISCUSSION/SUMMARY
[Medication Risks Reviewed] : Medication risks reviewed [Surgical risks reviewed] : Surgical risks reviewed [de-identified] : Patient is a 62-year-old female here today for evaluation of right knee pain has been going on for the past 2 weeks.  She does have some mild arthritic changes on her x-ray.  She has not yet tried conservative treatment I think that is warranted at this time.  I gave her injection of cortisone which she tolerated well.  I recommended physical therapy.  I given a prescription for meloxicam 15 mg daily as needed for pain.  We discussed low impact activity and exercises.  We also discussed rest ice and elevation.  I will see her back in 6 weeks for repeat evaluation if no improvement or symptoms we will obtain an MRI at that time.  All questions were asked and answered

## 2023-06-24 NOTE — ASSESSMENT
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 Discussed with patient these persistent low grade changes are not of concern in a woman with an intact immune system. She does have sarcoidosis and has had a splenectomy in the past, but is not a chronic steroid user and does not have a history of HIV/AIDS. Based on physical examination and stenotic pinpoint cervix, it was difficult to obtain an endocervical biopsy. I explained due to low grade changes on cervical biopsy from 6/2022 I am not overly concerned for there to be a more aggressive lesion, however ECC at that time was not satisfactory for evaluation therefore attempt was made today. Multiple attempts were made to dilated cervical os but were unsuccessful. I offered to open the os with local anesthetic and scalpel, however patient declined to do so at this time due to discomfort. I suggest we follow up her HPV genotype, if HPV 16 is detected and cervical biopsy is >LSIL I would recommend returning for ECC within 6 months. Should her cervical biopsy result as LSIL or less and HPV is not detected, she may follow up in 1 year. Once her results are in, PA will call to instruct patient on follow up.

## 2023-06-28 ENCOUNTER — APPOINTMENT (OUTPATIENT)
Dept: INTERNAL MEDICINE | Facility: CLINIC | Age: 63
End: 2023-06-28

## 2023-06-28 ENCOUNTER — NON-APPOINTMENT (OUTPATIENT)
Age: 63
End: 2023-06-28

## 2023-06-28 ENCOUNTER — OUTPATIENT (OUTPATIENT)
Dept: OUTPATIENT SERVICES | Facility: HOSPITAL | Age: 63
LOS: 1 days | End: 2023-06-28

## 2023-06-28 DIAGNOSIS — Z90.81 ACQUIRED ABSENCE OF SPLEEN: Chronic | ICD-10-CM

## 2023-06-28 DIAGNOSIS — Z98.890 OTHER SPECIFIED POSTPROCEDURAL STATES: Chronic | ICD-10-CM

## 2023-06-28 DIAGNOSIS — Z90.89 ACQUIRED ABSENCE OF OTHER ORGANS: Chronic | ICD-10-CM

## 2023-07-06 ENCOUNTER — RX CHANGE (OUTPATIENT)
Age: 63
End: 2023-07-06

## 2023-07-06 RX ORDER — LEVOCETIRIZINE DIHYDROCHLORIDE 5 MG/1
5 TABLET ORAL
Qty: 30 | Refills: 1 | Status: DISCONTINUED | COMMUNITY
Start: 2023-06-14 | End: 2023-07-06

## 2023-07-06 RX ORDER — LEVOCETIRIZINE DIHYDROCHLORIDE 5 MG/1
5 TABLET ORAL
Qty: 90 | Refills: 1 | Status: ACTIVE | COMMUNITY
Start: 2023-07-06 | End: 1900-01-01

## 2023-07-11 ENCOUNTER — APPOINTMENT (OUTPATIENT)
Dept: PULMONOLOGY | Facility: CLINIC | Age: 63
End: 2023-07-11
Payer: MEDICARE

## 2023-07-11 VITALS
RESPIRATION RATE: 16 BRPM | HEIGHT: 65 IN | SYSTOLIC BLOOD PRESSURE: 127 MMHG | HEART RATE: 107 BPM | OXYGEN SATURATION: 96 % | BODY MASS INDEX: 34.16 KG/M2 | DIASTOLIC BLOOD PRESSURE: 84 MMHG | TEMPERATURE: 98.3 F | WEIGHT: 205 LBS

## 2023-07-11 PROCEDURE — 99214 OFFICE O/P EST MOD 30 MIN: CPT | Mod: 25

## 2023-07-11 PROCEDURE — 96372 THER/PROPH/DIAG INJ SC/IM: CPT

## 2023-07-11 RX ORDER — TEZEPELUMAB-EKKO 210 MG/1.9ML
210 INJECTION, SOLUTION SUBCUTANEOUS
Refills: 0 | Status: COMPLETED | OUTPATIENT
Start: 2023-07-11

## 2023-07-11 RX ORDER — TEZEPELUMAB-EKKO 210 MG/1.9ML
210 INJECTION, SOLUTION SUBCUTANEOUS
Qty: 1 | Refills: 11 | Status: DISCONTINUED | COMMUNITY
Start: 2023-07-02 | End: 2023-07-11

## 2023-07-11 RX ADMIN — TEZEPELUMAB-EKKO 210 MG/1.91ML: 210 INJECTION, SOLUTION SUBCUTANEOUS at 00:00

## 2023-07-11 NOTE — ASSESSMENT
[FreeTextEntry1] : Asthma: poorly controlled on Fasenra/ Trelegy. First Tezpire injection today. Repeat spirometry on next visit \par \par RTC in 3 months\par \par I, Karyn Martin NP, am scribing for and in the presence of Dr. Chad Bob, the following sections HISTORY OF PRESENT ILLNESS, PAST MEDICAL/FAMILY/SOCIAL HISTORY; REVIEW OF SYSTEMS; VITAL SIGNS; PHYSICAL EXAM; DISPOSITION.\par \par

## 2023-07-11 NOTE — HISTORY OF PRESENT ILLNESS
[Never] : never [TextBox_4] : 62 y.o female PMH Eosinophilic Asthma, GERD, Laryngyospasm here for f/u asthma/ first Tezpire injection. Asthma has been uncontrolled on  Fasenra with multiple exacerbations since.  Reports symptoms improved s/p parainfluenza and prolonged course of prednisone. Has not needed her rescue inhaler over the past 2 weeks. \par \par PFT 2021: FEV1/FVC 84 FEV1 88 FVC 81 DLCO 109\par \par \par \par Social:\par Never smoker

## 2023-07-11 NOTE — PHYSICAL EXAM
[No Acute Distress] : no acute distress [Normal Rate/Rhythm] : normal rate/rhythm [Normal S1, S2] : normal s1, s2 [No Resp Distress] : no resp distress [Clear to Auscultation Bilaterally] : clear to auscultation bilaterally [No Edema] : no edema [No Focal Deficits] : no focal deficits [Oriented x3] : oriented x3

## 2023-07-12 RX ORDER — TEZEPELUMAB-EKKO 210 MG/1.9ML
210 INJECTION, SOLUTION SUBCUTANEOUS
Qty: 1 | Refills: 11 | Status: DISCONTINUED | COMMUNITY
Start: 2023-05-30 | End: 2023-07-12

## 2023-07-20 ENCOUNTER — APPOINTMENT (OUTPATIENT)
Dept: ORTHOPEDIC SURGERY | Facility: CLINIC | Age: 63
End: 2023-07-20

## 2023-07-24 ENCOUNTER — APPOINTMENT (OUTPATIENT)
Dept: ORTHOPEDIC SURGERY | Facility: CLINIC | Age: 63
End: 2023-07-24
Payer: MEDICARE

## 2023-07-24 VITALS — DIASTOLIC BLOOD PRESSURE: 85 MMHG | SYSTOLIC BLOOD PRESSURE: 137 MMHG | HEART RATE: 93 BPM

## 2023-07-24 PROCEDURE — 99213 OFFICE O/P EST LOW 20 MIN: CPT

## 2023-07-24 NOTE — HISTORY OF PRESENT ILLNESS
[de-identified] : Patient is a 8-year-old female here today for follow-up of her right knee pain.  She has been protected weightbearing with a cane since her last visit.  She states that the pain has been improving however she is walking for long distances or goes up and down stairs the pain and swelling in her knee worsens.  States it is over the lateral and posterior aspects of the knee.  Denies any new falls or trauma.  Has stopped physical therapy due to the results of her MRI

## 2023-07-24 NOTE — PHYSICAL EXAM
[de-identified] : MRI of the right knee dated 7/17/2023 shows a 7 mm healing nondisplaced subchondral stress fracture of the lateral plateau with moderate to severe marrow complex tear of the lateral meniscus with extrusion, moderate medial compartment and patellofemoral arthritis, moderate joint effusion [de-identified] :  antalgic and ambulates with cane . Right knee exam shows Mild effusion, ROM is 0-110 degrees (pain with terminal flexinon, No gross instability, 2A Lachman's testing, No pain with [place], Posteromedial joint line, popliteal fossa tenderness. Positive patellofemoral grind \par 5/5 motor strength in bilateral lower extremities. Sensory: Intact in bilateral lower extremities. DTRs: Biceps, brachioradialis, triceps, patellar, ankle and plantar 2+ and symmetric bilaterally. Pulses: dorsalis pedis, posterior tibial, femoral, popliteal, and radial 2+ and symmetric bilaterally. \par \par

## 2023-08-10 NOTE — DISCHARGE NOTE NURSING/CASE MANAGEMENT/SOCIAL WORK - DATE OF LAST VACCINATION
Patient's HM shows they are overdue for Colorectal Screening. Care Everywhere and  files searched. No results to attach to order nor HM updated. 06-Jul-2021

## 2023-08-23 ENCOUNTER — APPOINTMENT (OUTPATIENT)
Dept: ORTHOPEDIC SURGERY | Facility: CLINIC | Age: 63
End: 2023-08-23
Payer: MEDICARE

## 2023-08-23 VITALS
HEIGHT: 66 IN | DIASTOLIC BLOOD PRESSURE: 80 MMHG | SYSTOLIC BLOOD PRESSURE: 115 MMHG | WEIGHT: 210 LBS | HEART RATE: 87 BPM | BODY MASS INDEX: 33.75 KG/M2

## 2023-08-23 PROCEDURE — 99214 OFFICE O/P EST MOD 30 MIN: CPT | Mod: 25

## 2023-08-23 PROCEDURE — 20610 DRAIN/INJ JOINT/BURSA W/O US: CPT | Mod: RT

## 2023-08-23 NOTE — PHYSICAL EXAM
[Cane] : ambulates with cane [de-identified] : antalgic and ambulates with cane . Right knee exam shows Mild effusion, ROM is 0-110 degrees (pain with terminal flexion), No gross instability, 2A Lachman's testing, No pain with [place], Posteromedial joint line, popliteal fossa tenderness. Positive patellofemoral grind  5/5 motor strength in bilateral lower extremities. Sensory: Intact in bilateral lower extremities. DTRs: Biceps, brachioradialis, triceps, patellar, ankle and plantar 2+ and symmetric bilaterally. Pulses: dorsalis pedis, posterior tibial, femoral, popliteal, and radial 2+ and symmetric bilaterally.

## 2023-08-23 NOTE — DISCUSSION/SUMMARY
[Medication Risks Reviewed] : Medication risks reviewed [Surgical risks reviewed] : Surgical risks reviewed [1 Week] : in 1 week [de-identified] : Patient is a 62-year-old female with a right lateral tibial plateau insufficiency fracture and osteoarthritis presenting today for follow-up.  I have given her new prescription for meloxicam 15 mg daily as needed for pain.  I recommend that she begin to advance her weightbearing status.  I gave her an injection of Synvisc in the right knee which she tolerated well.  I will see her back in 1 week for repeat injection.  All questions were asked and answered

## 2023-08-23 NOTE — HISTORY OF PRESENT ILLNESS
[Pain Location] : pain [] : right knee [Worsening] : worsening [___ mths] : [unfilled] month(s) ago [Standing] : standing [Constant] : ~He/She~ states the symptoms seem to be constant [Running] : worsened by running [Walking] : worsened by walking [NSAIDs] : relieved by nonsteroidal anti-inflammatory drugs [Physical Therapy] : relieved by physical therapy [de-identified] : 62-year-old female here today for follow-up of her right knee pain. The patient states her prescription for Meloxicam finished 2 days ago. She ambulates with the use of a cane, but states she is unable to walk with confidence without it. She has a history of cortisone injections in the right knee. She has tried using crutches and a walker but found it to be difficult. The patient states the pain began 07/03/2023. She was supposed to get gel injections at her last visit but was told they were on back order. She feels as though she has no quality of life. The pain is primarily on the sides of the knee.  States that is slowly improving.  Denies any recent falls or trauma [de-identified] : going up and down the stairs & rising from a seated position

## 2023-08-23 NOTE — PROCEDURE
[Injection] : Injection [Right] : of the right [Knee Joint] : knee joint [Osteoarthritis] : Osteoarthritis [Joint Pain] : Joint pain [Patient] : patient [Alcohol] : Alcohol [Ethyl Chloride Spray] : ethyl chloride spray was used as a topical anesthetic [Lateral] : lateral [22] : a 22-gauge [2 mL Synvisc___(lot #)] : 2 mL Synvisc ~Ulot# [unfilled] [Bandage Applied] : a bandage [Tolerated Well] : The patient tolerated the procedure well [None] : none [No Strenuous Activity___day(s)] : avoid strenuous activity for [unfilled] day(s) [___ Week(s)] : in [unfilled] week(s) [de-identified] : Synvisc Injection # 1 Right Knee  Discussed at length with the patient the planned Synvisc injection. The risks, benefits, convalescence and alternatives were reviewed. The possible side effects discussed included but were not limited to: pain, swelling, heat and redness. There symptoms are generally mild but if they are extensive then contact the office. Giving pain relievers by mouth such as NSAIDs or Tylenol can generally treat the reactions to Synvisc Rare cases of infection have been noted. Rash, hives and itching may occur post injection. If you have muscle pain or cramps, flushing and or swelling of the face, rapid heart beat, nausea, dizziness, fever, chills, headache, difficulty breathing, swelling in the arms or legs, or have a prickly feeling of your skin, contact a health care provider immediately.  Following this discussion, the knee was prepped with Betadine and under sterile condition the Synvisc injection was performed with a 22 gauge needle. The needle was introduced into the joint, aspiration was performed to ensure intra-articular placement and the medication was injected. Upon withdrawal of the needle the site was cleaned with alcohol and a Band-Aid applied. The patient tolerated the injection well and there were no adverse effects. Post injection instructions included no strenuous activity for 24 hours, cryotherapy and if there are any adverse effects to contact the office. [FreeTextEntry8] : 02/28/2026

## 2023-08-23 NOTE — REASON FOR VISIT
[_______] : lashonda ALVARES  for [Other: ____] : [unfilled] [FreeTextEntry2] : Lot #: [id number] | EXP: 02/28/2026

## 2023-08-30 ENCOUNTER — APPOINTMENT (OUTPATIENT)
Dept: ORTHOPEDIC SURGERY | Facility: CLINIC | Age: 63
End: 2023-08-30
Payer: MEDICARE

## 2023-08-30 VITALS
WEIGHT: 215 LBS | DIASTOLIC BLOOD PRESSURE: 78 MMHG | HEART RATE: 93 BPM | HEIGHT: 66 IN | BODY MASS INDEX: 34.55 KG/M2 | SYSTOLIC BLOOD PRESSURE: 115 MMHG

## 2023-08-30 PROCEDURE — 20610 DRAIN/INJ JOINT/BURSA W/O US: CPT | Mod: RT

## 2023-08-30 NOTE — HISTORY OF PRESENT ILLNESS
[de-identified] : 62-year-old female here today for follow-up of her right knee pain.  She does present today for Synvisc injection #2 for the right knee. No significant change in symptoms since her first injection last week.  She does continue report intermittent, mild to moderate dull aching pain and some residual swelling in the knee. [Pain Location] : pain [] : right knee [Worsening] : worsening [___ mths] : [unfilled] month(s) ago [Standing] : standing [Constant] : ~He/She~ states the symptoms seem to be constant [Running] : worsened by running [Walking] : worsened by walking [NSAIDs] : relieved by nonsteroidal anti-inflammatory drugs [Physical Therapy] : relieved by physical therapy [de-identified] : going up and down the stairs & rising from a seated position

## 2023-08-30 NOTE — DISCUSSION/SUMMARY
[Medication Risks Reviewed] : Medication risks reviewed [Surgical risks reviewed] : Surgical risks reviewed [1 Week] : in 1 week [de-identified] : Patient is a 62-year-old female with a right lateral tibial plateau insufficiency fracture and osteoarthritis presenting today for follow-up.   Plan:  We did agree to proceed with a course of viscosupplementation, Synvisc.  Injection #2 was given to the right knee today.  Patient will follow-up for her third and final injection next week.  All questions answered.

## 2023-08-30 NOTE — REASON FOR VISIT
[_______] : lashonda ALVARES  for [Other: ____] : [unfilled] [FreeTextEntry2] : Lot #: [id number] | EXP: 02/28/2026

## 2023-08-30 NOTE — PROCEDURE
[Injection] : Injection [Right] : of the right [Knee Joint] : knee joint [Osteoarthritis] : Osteoarthritis [Joint Pain] : Joint pain [Patient] : patient [Alcohol] : Alcohol [Ethyl Chloride Spray] : ethyl chloride spray was used as a topical anesthetic [Lateral] : lateral [22] : a 22-gauge [2 mL Synvisc___(lot #)] : 2 mL Synvisc ~Ulot# [unfilled] [Bandage Applied] : a bandage [Tolerated Well] : The patient tolerated the procedure well [None] : none [No Strenuous Activity___day(s)] : avoid strenuous activity for [unfilled] day(s) [___ Week(s)] : in [unfilled] week(s) [de-identified] : Synvisc Injection # 2 Right Knee  Discussed at length with the patient the planned Synvisc injection. The risks, benefits, convalescence and alternatives were reviewed. The possible side effects discussed included but were not limited to: pain, swelling, heat and redness. There symptoms are generally mild but if they are extensive then contact the office. Giving pain relievers by mouth such as NSAIDs or Tylenol can generally treat the reactions to Synvisc Rare cases of infection have been noted. Rash, hives and itching may occur post injection. If you have muscle pain or cramps, flushing and or swelling of the face, rapid heart beat, nausea, dizziness, fever, chills, headache, difficulty breathing, swelling in the arms or legs, or have a prickly feeling of your skin, contact a health care provider immediately.  Following this discussion, the knee was prepped with Betadine and under sterile condition the Synvisc injection was performed with a 22 gauge needle. The needle was introduced into the joint, aspiration was performed to ensure intra-articular placement and the medication was injected. Upon withdrawal of the needle the site was cleaned with alcohol and a Band-Aid applied. The patient tolerated the injection well and there were no adverse effects. Post injection instructions included no strenuous activity for 24 hours, cryotherapy and if there are any adverse effects to contact the office. [FreeTextEntry8] : 02/28/2026

## 2023-09-06 ENCOUNTER — APPOINTMENT (OUTPATIENT)
Dept: ORTHOPEDIC SURGERY | Facility: CLINIC | Age: 63
End: 2023-09-06
Payer: MEDICARE

## 2023-09-06 VITALS
HEIGHT: 66 IN | SYSTOLIC BLOOD PRESSURE: 114 MMHG | HEART RATE: 94 BPM | WEIGHT: 215 LBS | DIASTOLIC BLOOD PRESSURE: 80 MMHG | BODY MASS INDEX: 34.55 KG/M2

## 2023-09-06 PROCEDURE — 20610 DRAIN/INJ JOINT/BURSA W/O US: CPT | Mod: RT

## 2023-09-06 RX ORDER — MELOXICAM 15 MG/1
15 TABLET ORAL
Qty: 30 | Refills: 1 | Status: ACTIVE | COMMUNITY
Start: 2023-06-15 | End: 1900-01-01

## 2023-09-06 NOTE — PROCEDURE
[Injection] : Injection [Right] : of the right [Knee Joint] : knee joint [Osteoarthritis] : Osteoarthritis [Joint Pain] : Joint pain [Patient] : patient [Alcohol] : Alcohol [Ethyl Chloride Spray] : ethyl chloride spray was used as a topical anesthetic [Lateral] : lateral [22] : a 22-gauge [2 mL Synvisc___(lot #)] : 2 mL Synvisc ~Ulot# [unfilled] [Bandage Applied] : a bandage [Tolerated Well] : The patient tolerated the procedure well [None] : none [No Strenuous Activity___day(s)] : avoid strenuous activity for [unfilled] day(s) [___ Month(s)] : in [unfilled] month(s) [de-identified] : Synvisc Injection # 3 Right Knee  Discussed at length with the patient the planned Synvisc injection. The risks, benefits, convalescence and alternatives were reviewed. The possible side effects discussed included but were not limited to: pain, swelling, heat and redness. There symptoms are generally mild but if they are extensive then contact the office. Giving pain relievers by mouth such as NSAIDs or Tylenol can generally treat the reactions to Synvisc Rare cases of infection have been noted. Rash, hives and itching may occur post injection. If you have muscle pain or cramps, flushing and or swelling of the face, rapid heart beat, nausea, dizziness, fever, chills, headache, difficulty breathing, swelling in the arms or legs, or have a prickly feeling of your skin, contact a health care provider immediately.  Following this discussion, the knee was prepped with Betadine and under sterile condition the Synvisc injection was performed with a 22 gauge needle. The needle was introduced into the joint, aspiration was performed to ensure intra-articular placement and the medication was injected. Upon withdrawal of the needle the site was cleaned with alcohol and a Band-Aid applied. The patient tolerated the injection well and there were no adverse effects. Post injection instructions included no strenuous activity for 24 hours, cryotherapy and if there are any adverse effects to contact the office. [FreeTextEntry8] : EXP: 02/28/2026

## 2023-09-06 NOTE — ADDENDUM
[FreeTextEntry1] : This note was written by Frida Naidu, acting as the  for SHAE Galeano. This note accurately reflects the work and decisions made by SHAE Galeano.

## 2023-09-06 NOTE — HISTORY OF PRESENT ILLNESS
[de-identified] : 62-year-old female here today for follow-up of her right knee pain and to receive her 3rd of 3 Synvisc Gel Injections in her right knee. [Pain Location] : pain [] : right knee [Worsening] : worsening [___ mths] : [unfilled] month(s) ago [Standing] : standing [Constant] : ~He/She~ states the symptoms seem to be constant [Running] : worsened by running [Walking] : worsened by walking [NSAIDs] : relieved by nonsteroidal anti-inflammatory drugs [Physical Therapy] : relieved by physical therapy [de-identified] : going up and down the stairs & rising from a seated position

## 2023-09-06 NOTE — REASON FOR VISIT
[_______] : lashonda ALVARES  for [Other: ____] : [unfilled] [FreeTextEntry2] : LOT# [id number] EXP: 02/28/2026

## 2023-09-06 NOTE — DISCUSSION/SUMMARY
[Medication Risks Reviewed] : Medication risks reviewed [Surgical risks reviewed] : Surgical risks reviewed [Other: ____] : in [unfilled] [de-identified] : 62 year old female with a right lateral tibial plateau insufficiency fracture and osteoarthritis status post 3rd of 3 Synvisc Gel Injections in the right knee. The patient tolerated the procedure well. She will follow-up in 3 months for repeat evaluation. All questions were asked and answered. The patient verbalized understanding the plan.

## 2023-09-27 ENCOUNTER — APPOINTMENT (OUTPATIENT)
Dept: OTOLARYNGOLOGY | Facility: CLINIC | Age: 63
End: 2023-09-27
Payer: MEDICARE

## 2023-09-27 DIAGNOSIS — K21.9 GASTRO-ESOPHAGEAL REFLUX DISEASE W/OUT ESOPHAGITIS: ICD-10-CM

## 2023-09-27 DIAGNOSIS — B37.0 CANDIDAL STOMATITIS: ICD-10-CM

## 2023-09-27 DIAGNOSIS — J39.8 OTHER SPECIFIED DISEASES OF UPPER RESPIRATORY TRACT: ICD-10-CM

## 2023-09-27 DIAGNOSIS — Q31.5 CONGENITAL LARYNGOMALACIA: ICD-10-CM

## 2023-09-27 DIAGNOSIS — G47.30 SLEEP APNEA, UNSPECIFIED: ICD-10-CM

## 2023-09-27 DIAGNOSIS — R06.9 UNSPECIFIED ABNORMALITIES OF BREATHING: ICD-10-CM

## 2023-09-27 DIAGNOSIS — J38.5 LARYNGEAL SPASM: ICD-10-CM

## 2023-09-27 PROCEDURE — 69210 REMOVE IMPACTED EAR WAX UNI: CPT

## 2023-09-27 PROCEDURE — 99214 OFFICE O/P EST MOD 30 MIN: CPT | Mod: 25

## 2023-09-27 PROCEDURE — 31579 LARYNGOSCOPY TELESCOPIC: CPT

## 2023-09-27 RX ORDER — NYSTATIN 100000 [USP'U]/ML
100000 SUSPENSION ORAL 3 TIMES DAILY
Qty: 1 | Refills: 1 | Status: ACTIVE | COMMUNITY
Start: 2023-09-27 | End: 1900-01-01

## 2023-10-03 ENCOUNTER — NON-APPOINTMENT (OUTPATIENT)
Age: 63
End: 2023-10-03

## 2023-10-10 ENCOUNTER — APPOINTMENT (OUTPATIENT)
Dept: ORTHOPEDIC SURGERY | Facility: CLINIC | Age: 63
End: 2023-10-10
Payer: MEDICARE

## 2023-10-10 ENCOUNTER — APPOINTMENT (OUTPATIENT)
Dept: PULMONOLOGY | Facility: CLINIC | Age: 63
End: 2023-10-10
Payer: MEDICARE

## 2023-10-10 VITALS
TEMPERATURE: 98.3 F | HEIGHT: 66 IN | HEART RATE: 106 BPM | BODY MASS INDEX: 34.55 KG/M2 | WEIGHT: 215 LBS | SYSTOLIC BLOOD PRESSURE: 136 MMHG | DIASTOLIC BLOOD PRESSURE: 84 MMHG | RESPIRATION RATE: 16 BRPM | OXYGEN SATURATION: 96 %

## 2023-10-10 VITALS
WEIGHT: 215 LBS | SYSTOLIC BLOOD PRESSURE: 136 MMHG | BODY MASS INDEX: 34.55 KG/M2 | HEIGHT: 66 IN | DIASTOLIC BLOOD PRESSURE: 84 MMHG | HEART RATE: 106 BPM

## 2023-10-10 DIAGNOSIS — M17.11 UNILATERAL PRIMARY OSTEOARTHRITIS, RIGHT KNEE: ICD-10-CM

## 2023-10-10 DIAGNOSIS — J45.909 UNSPECIFIED ASTHMA, UNCOMPLICATED: ICD-10-CM

## 2023-10-10 PROCEDURE — 20610 DRAIN/INJ JOINT/BURSA W/O US: CPT | Mod: RT

## 2023-10-10 PROCEDURE — 99214 OFFICE O/P EST MOD 30 MIN: CPT | Mod: 25

## 2023-10-10 PROCEDURE — 99214 OFFICE O/P EST MOD 30 MIN: CPT

## 2023-10-10 RX ORDER — TEZEPELUMAB-EKKO 210 MG/1.9ML
210 INJECTION, SOLUTION SUBCUTANEOUS
Refills: 0 | Status: COMPLETED | OUTPATIENT
Start: 2023-10-10

## 2023-10-10 RX ADMIN — TEZEPELUMAB-EKKO 210 MG/1.91ML: 210 INJECTION, SOLUTION SUBCUTANEOUS at 00:00

## 2023-10-12 ENCOUNTER — APPOINTMENT (OUTPATIENT)
Dept: RHEUMATOLOGY | Facility: CLINIC | Age: 63
End: 2023-10-12
Payer: MEDICARE

## 2023-10-12 VITALS
HEART RATE: 103 BPM | BODY MASS INDEX: 34.55 KG/M2 | DIASTOLIC BLOOD PRESSURE: 84 MMHG | OXYGEN SATURATION: 96 % | WEIGHT: 215 LBS | HEIGHT: 66 IN | SYSTOLIC BLOOD PRESSURE: 131 MMHG

## 2023-10-12 DIAGNOSIS — D86.9 SARCOIDOSIS, UNSPECIFIED: ICD-10-CM

## 2023-10-12 DIAGNOSIS — M17.0 BILATERAL PRIMARY OSTEOARTHRITIS OF KNEE: ICD-10-CM

## 2023-10-12 PROCEDURE — 99214 OFFICE O/P EST MOD 30 MIN: CPT

## 2023-10-12 RX ORDER — DIPHENHYDRAMINE HCL 25 MG/1
25 CAPSULE ORAL
Qty: 4 | Refills: 1 | Status: DISCONTINUED | COMMUNITY
Start: 2021-02-10 | End: 2023-10-12

## 2023-10-12 RX ORDER — BENRALIZUMAB 30 MG/ML
30 INJECTION, SOLUTION SUBCUTANEOUS
Qty: 1 | Refills: 5 | Status: DISCONTINUED | COMMUNITY
Start: 2020-02-11 | End: 2023-10-12

## 2023-10-12 RX ORDER — EPINEPHRINE 0.3 MG/.3ML
0.3 INJECTION INTRAMUSCULAR
Qty: 1 | Refills: 3 | Status: DISCONTINUED | COMMUNITY
Start: 2020-06-24 | End: 2023-10-12

## 2023-10-12 RX ORDER — HYLAN G-F 20 16MG/2ML
16 SYRINGE (ML) INTRAARTICULAR
Qty: 1 | Refills: 0 | Status: DISCONTINUED | COMMUNITY
Start: 2023-07-28 | End: 2023-10-12

## 2023-10-12 RX ORDER — MELOXICAM 15 MG/1
15 TABLET ORAL
Refills: 0 | Status: DISCONTINUED | COMMUNITY
Start: 2023-07-11 | End: 2023-10-12

## 2023-10-12 RX ORDER — AMOXICILLIN AND CLAVULANATE POTASSIUM 875; 125 MG/1; MG/1
875-125 TABLET, COATED ORAL
Qty: 14 | Refills: 0 | Status: DISCONTINUED | COMMUNITY
Start: 2023-05-23 | End: 2023-10-12

## 2023-10-12 RX ORDER — PREDNISONE 20 MG/1
20 TABLET ORAL DAILY
Qty: 10 | Refills: 0 | Status: DISCONTINUED | COMMUNITY
Start: 2023-05-22 | End: 2023-10-12

## 2023-10-12 RX ORDER — PREDNISONE 10 MG/1
10 TABLET ORAL DAILY
Qty: 3 | Refills: 0 | Status: DISCONTINUED | COMMUNITY
Start: 2023-05-30 | End: 2023-10-12

## 2023-10-12 RX ORDER — HYALURONATE SODIUM 20 MG/2 ML
20 SYRINGE (ML) INTRAARTICULAR
Qty: 1 | Refills: 0 | Status: DISCONTINUED | OUTPATIENT
Start: 2023-07-24 | End: 2023-10-12

## 2023-10-13 ENCOUNTER — OUTPATIENT (OUTPATIENT)
Dept: OUTPATIENT SERVICES | Facility: HOSPITAL | Age: 63
LOS: 1 days | End: 2023-10-13
Payer: MEDICARE

## 2023-10-13 DIAGNOSIS — Z98.890 OTHER SPECIFIED POSTPROCEDURAL STATES: Chronic | ICD-10-CM

## 2023-10-13 DIAGNOSIS — Z90.89 ACQUIRED ABSENCE OF OTHER ORGANS: Chronic | ICD-10-CM

## 2023-10-13 DIAGNOSIS — Z90.81 ACQUIRED ABSENCE OF SPLEEN: Chronic | ICD-10-CM

## 2023-10-13 DIAGNOSIS — D86.9 SARCOIDOSIS, UNSPECIFIED: ICD-10-CM

## 2023-10-13 PROCEDURE — 71046 X-RAY EXAM CHEST 2 VIEWS: CPT | Mod: 26

## 2023-10-15 PROBLEM — M17.0 BILATERAL PRIMARY OSTEOARTHRITIS OF KNEE: Status: ACTIVE | Noted: 2023-01-04

## 2023-10-17 ENCOUNTER — RX RENEWAL (OUTPATIENT)
Age: 63
End: 2023-10-17

## 2023-10-17 LAB
ACE BLD-CCNC: 47 U/L
ALBUMIN SERPL ELPH-MCNC: 4.9 G/DL
ALP BLD-CCNC: 111 U/L
ALT SERPL-CCNC: 12 U/L
ANION GAP SERPL CALC-SCNC: 14 MMOL/L
AST SERPL-CCNC: 15 U/L
BILIRUB SERPL-MCNC: 0.3 MG/DL
BUN SERPL-MCNC: 13 MG/DL
CALCIUM SERPL-MCNC: 9.9 MG/DL
CCP AB SER IA-ACNC: <8 UNITS
CHLORIDE SERPL-SCNC: 100 MMOL/L
CO2 SERPL-SCNC: 24 MMOL/L
CREAT SERPL-MCNC: 0.8 MG/DL
CRP SERPL-MCNC: 10 MG/L
DSDNA AB SER-ACNC: <12 IU/ML
EGFR: 84 ML/MIN/1.73M2
ENA RNP AB SER IA-ACNC: 0.9 AL
ENA SCL70 IGG SER IA-ACNC: <0.2 AL
ENA SM AB SER IA-ACNC: <0.2 AL
ENA SS-A AB SER IA-ACNC: <0.2 AL
ENA SS-B AB SER IA-ACNC: <0.2 AL
ERYTHROCYTE [SEDIMENTATION RATE] IN BLOOD BY WESTERGREN METHOD: 44 MM/HR
GLUCOSE SERPL-MCNC: 91 MG/DL
HCT VFR BLD CALC: 45.3 %
HGB BLD-MCNC: 14.1 G/DL
MCHC RBC-ENTMCNC: 30.9 PG
MCHC RBC-ENTMCNC: 31.1 GM/DL
MCV RBC AUTO: 99.1 FL
PLATELET # BLD AUTO: 247 K/UL
POTASSIUM SERPL-SCNC: 5.1 MMOL/L
PROT SERPL-MCNC: 7.7 G/DL
RBC # BLD: 4.57 M/UL
RBC # FLD: 15.5 %
RF+CCP IGG SER-IMP: NEGATIVE
RHEUMATOID FACT SER QL: <10 IU/ML
SODIUM SERPL-SCNC: 138 MMOL/L
TSH SERPL-ACNC: 2.39 UIU/ML
WBC # FLD AUTO: 7.98 K/UL

## 2023-10-19 ENCOUNTER — NON-APPOINTMENT (OUTPATIENT)
Age: 63
End: 2023-10-19

## 2023-11-02 ENCOUNTER — APPOINTMENT (OUTPATIENT)
Dept: ORTHOPEDIC SURGERY | Facility: CLINIC | Age: 63
End: 2023-11-02
Payer: MEDICARE

## 2023-11-02 PROCEDURE — 99441: CPT

## 2023-11-02 RX ORDER — DICLOFENAC SODIUM 50 MG/1
50 TABLET, DELAYED RELEASE ORAL
Qty: 60 | Refills: 0 | Status: ACTIVE | COMMUNITY
Start: 2023-11-02 | End: 1900-01-01

## 2023-12-06 ENCOUNTER — APPOINTMENT (OUTPATIENT)
Dept: ORTHOPEDIC SURGERY | Facility: CLINIC | Age: 63
End: 2023-12-06

## 2023-12-15 NOTE — HISTORY OF PRESENT ILLNESS
- May apply ice to affected areas 10-15 minutes at a time, multiple times per day. You may use as frequently as desired.  - May take ibuprofen 600mg every 6 hours as needed for pain control  - Elevate extremity while resting   - Rest affected area, avoid heavy lifting, exertion  - May use sling for 1-2 days, avoid extended use  - Follow-up with orthopedic doctor provided within 1-2 weeks for further evaluation if pain persists     - Puede aplicar hielo en las áreas afectadas de 10 a 15 minutos por vez, varias veces al día. Puede utilizarlo con tanta frecuencia iron desee.  - Puede gerald ibuprofeno 600 mg cada 6 horas según sea necesario para controlar el dolor.  - Elevar la extremidad en reposo.  - Descanse la jamey afectada, evite levantar objetos pesados ??y hacer esfuerzos.  - Puede usar el cabestrillo guru 1 o 2 días, evite el uso prolongado  - Seguimiento con un médico ortopédico dentro de 1 a 2 semanas para betty evaluación adicional si el dolor persiste [TextBox_4] : 62 y.o female PMH Eosinophilic Asthma here for follow up asthma- recently moved back from [facility] to . Her asthma has been maintained on on Breo / Incruse and Fasenra. She has a recent exacerbation three weeks ago ^ URI. treated by abx and prednisone. \par \par \par

## 2023-12-29 ENCOUNTER — RX RENEWAL (OUTPATIENT)
Age: 63
End: 2023-12-29

## 2024-03-15 RX ORDER — FLUTICASONE FUROATE AND VILANTEROL TRIFENATATE 100; 25 UG/1; UG/1
1 POWDER RESPIRATORY (INHALATION)
Qty: 0 | Refills: 0 | DISCHARGE

## 2024-03-15 RX ORDER — FLUTICASONE PROPIONATE 220 MCG
1 AEROSOL WITH ADAPTER (GRAM) INHALATION
Qty: 0 | Refills: 0 | DISCHARGE

## 2024-03-15 RX ORDER — DILTIAZEM HCL 120 MG
0 CAPSULE, EXT RELEASE 24 HR ORAL
Qty: 0 | Refills: 0 | DISCHARGE

## 2024-03-15 RX ORDER — UMECLIDINIUM 62.5 UG/1
1 AEROSOL, POWDER ORAL
Qty: 0 | Refills: 0 | DISCHARGE

## 2024-03-15 RX ORDER — ASCORBIC ACID 60 MG
0 TABLET,CHEWABLE ORAL
Qty: 0 | Refills: 0 | DISCHARGE

## 2024-03-15 RX ORDER — LEVALBUTEROL 1.25 MG/.5ML
0 SOLUTION, CONCENTRATE RESPIRATORY (INHALATION)
Qty: 0 | Refills: 0 | DISCHARGE

## 2024-03-15 RX ORDER — AZELASTINE 137 UG/1
0 SPRAY, METERED NASAL
Qty: 0 | Refills: 0 | DISCHARGE

## 2024-03-15 RX ORDER — PANTOPRAZOLE SODIUM 20 MG/1
1 TABLET, DELAYED RELEASE ORAL
Qty: 0 | Refills: 0 | DISCHARGE

## 2024-03-15 RX ORDER — VALACYCLOVIR 500 MG/1
1 TABLET, FILM COATED ORAL
Qty: 0 | Refills: 0 | DISCHARGE

## 2024-03-15 RX ORDER — FAMOTIDINE 10 MG/ML
1 INJECTION INTRAVENOUS
Qty: 0 | Refills: 0 | DISCHARGE

## 2024-03-15 RX ORDER — MONTELUKAST 4 MG/1
1 TABLET, CHEWABLE ORAL
Qty: 0 | Refills: 0 | DISCHARGE

## 2024-03-15 RX ORDER — ZINC SULFATE TAB 220 MG (50 MG ZINC EQUIVALENT) 220 (50 ZN) MG
0 TAB ORAL
Qty: 0 | Refills: 0 | DISCHARGE

## 2024-03-15 RX ORDER — ERENUMAB-AOOE 70 MG/ML
0 INJECTION SUBCUTANEOUS
Qty: 0 | Refills: 0 | DISCHARGE

## 2024-03-15 RX ORDER — ALPRAZOLAM 0.25 MG
0 TABLET ORAL
Qty: 0 | Refills: 0 | DISCHARGE

## 2024-03-15 RX ORDER — LACTOBACILLUS ACIDOPHILUS 100MM CELL
0 CAPSULE ORAL
Qty: 0 | Refills: 0 | DISCHARGE

## 2024-03-15 RX ORDER — BENRALIZUMAB 30 MG/ML
0 INJECTION, SOLUTION SUBCUTANEOUS
Qty: 0 | Refills: 0 | DISCHARGE

## 2024-03-15 RX ORDER — PRIMIDONE 250 MG/1
0 TABLET ORAL
Qty: 0 | Refills: 0 | DISCHARGE

## 2024-03-18 NOTE — ED ADULT TRIAGE NOTE - GLASGOW COMA SCALE: BEST VERBAL RESPONSE, MLM
Anesthesia Post-op Note    Patient: Esperanza Pathak  Procedure(s) Performed: ULTRASOUND, ENDOSCOPIC  Anesthesia type: MAC    Vitals Value Taken Time   Temp 35.7 °C (96.3 °F) 03/18/24 0920   Pulse 67 03/18/24 0920   Resp 15 03/18/24 0920   SpO2 95 % 03/18/24 0920   /58 03/18/24 0920         Patient Location: Phase II  Post-op Vital Signs:stable  Level of Consciousness: participates in exam and awake  Respiratory Status: spontaneous ventilation, unassisted and room air  Cardiovascular blood pressure returned to baseline and stable  Hydration: euvolemic  Pain Management: well controlled  Handoff: Handoff to receiving clinician was performed and questions were answered  Vomiting: none  Nausea: None  Airway Patency:patent  Post-op Assessment: awake, alert, appropriately conversant, or baseline, no complications and patient tolerated procedure well      No notable events documented.                       (V5) oriented 0 = independent

## 2024-05-12 ENCOUNTER — RX RENEWAL (OUTPATIENT)
Age: 64
End: 2024-05-12

## 2024-06-02 ENCOUNTER — RX RENEWAL (OUTPATIENT)
Age: 64
End: 2024-06-02

## 2024-06-02 RX ORDER — TEZEPELUMAB-EKKO 210 MG/1.9ML
210 INJECTION, SOLUTION SUBCUTANEOUS
Qty: 1.91 | Refills: 11 | Status: ACTIVE | COMMUNITY
Start: 2023-07-12 | End: 1900-01-01

## 2024-06-06 ENCOUNTER — RX RENEWAL (OUTPATIENT)
Age: 64
End: 2024-06-06

## 2024-06-06 RX ORDER — FLUTICASONE FUROATE, UMECLIDINIUM BROMIDE AND VILANTEROL TRIFENATATE 200; 62.5; 25 UG/1; UG/1; UG/1
200-62.5-25 POWDER RESPIRATORY (INHALATION) DAILY
Qty: 60 | Refills: 3 | Status: ACTIVE | COMMUNITY
Start: 2023-01-26 | End: 1900-01-01